# Patient Record
Sex: MALE | Race: BLACK OR AFRICAN AMERICAN | NOT HISPANIC OR LATINO | Employment: OTHER | URBAN - METROPOLITAN AREA
[De-identification: names, ages, dates, MRNs, and addresses within clinical notes are randomized per-mention and may not be internally consistent; named-entity substitution may affect disease eponyms.]

---

## 2017-03-06 ENCOUNTER — HOSPITAL ENCOUNTER (OUTPATIENT)
Dept: RADIOLOGY | Facility: HOSPITAL | Age: 52
Discharge: HOME | End: 2017-03-06
Attending: INTERNAL MEDICINE
Payer: MEDICARE

## 2017-03-06 DIAGNOSIS — M25.561 RIGHT KNEE PAIN: ICD-10-CM

## 2017-03-06 PROCEDURE — 73560 X-RAY EXAM OF KNEE 1 OR 2: CPT | Mod: TC,PO,RT

## 2017-08-03 ENCOUNTER — HOSPITAL ENCOUNTER (EMERGENCY)
Facility: HOSPITAL | Age: 52
Discharge: HOME | End: 2017-08-03
Attending: EMERGENCY MEDICINE
Payer: MEDICARE

## 2017-08-03 VITALS
HEIGHT: 66 IN | BODY MASS INDEX: 34.55 KG/M2 | SYSTOLIC BLOOD PRESSURE: 130 MMHG | OXYGEN SATURATION: 98 % | WEIGHT: 215 LBS | RESPIRATION RATE: 20 BRPM | DIASTOLIC BLOOD PRESSURE: 87 MMHG | HEART RATE: 80 BPM | TEMPERATURE: 99 F

## 2017-08-03 DIAGNOSIS — S40.012A CONTUSION OF LEFT SHOULDER, INITIAL ENCOUNTER: Primary | ICD-10-CM

## 2017-08-03 PROCEDURE — 99283 EMERGENCY DEPT VISIT LOW MDM: CPT

## 2017-08-03 PROCEDURE — 25000003 PHARM REV CODE 250: Performed by: EMERGENCY MEDICINE

## 2017-08-03 RX ORDER — TRAMADOL HYDROCHLORIDE AND ACETAMINOPHEN 37.5; 325 MG/1; MG/1
1-2 TABLET, FILM COATED ORAL EVERY 6 HOURS PRN
Qty: 20 TABLET | Refills: 0 | Status: SHIPPED | OUTPATIENT
Start: 2017-08-03

## 2017-08-03 RX ORDER — METFORMIN HYDROCHLORIDE 500 MG/1
500 TABLET ORAL 2 TIMES DAILY WITH MEALS
COMMUNITY

## 2017-08-03 RX ORDER — IBUPROFEN 400 MG/1
800 TABLET ORAL
Status: COMPLETED | OUTPATIENT
Start: 2017-08-03 | End: 2017-08-03

## 2017-08-03 RX ORDER — ATORVASTATIN CALCIUM 10 MG/1
40 TABLET, FILM COATED ORAL DAILY
COMMUNITY

## 2017-08-03 RX ADMIN — IBUPROFEN 800 MG: 400 TABLET, FILM COATED ORAL at 04:08

## 2017-08-03 NOTE — ED PROVIDER NOTES
"Encounter Date: 8/3/2017       History     Chief Complaint   Patient presents with    Shoulder Pain     Pt states "I fell about a hour ago to the concrete and it hurt my left shoulder"     Patient is a 52-year-old female who presents to the emergency room with a chief complaint left shoulder pain after a fall experienced today.  He states he lost his balance and landed on his left shoulder.  There was no loss of consciousness.  He denies a syncopal episode.  There is no deformity.  There is no numbness or tingling.  He does complain of 6 out of 10 pain to the area.          Review of patient's allergies indicates:   Allergen Reactions    Haldol [haloperidol lactate] Other (See Comments)     Past Medical History:   Diagnosis Date    Diabetes mellitus     Hypertension      History reviewed. No pertinent surgical history.  History reviewed. No pertinent family history.  Social History   Substance Use Topics    Smoking status: Never Smoker    Smokeless tobacco: Never Used    Alcohol use Not on file     Review of Systems   Constitutional: Negative for fever.   HENT: Negative for sore throat.    Respiratory: Negative for shortness of breath.    Cardiovascular: Negative for chest pain.   Gastrointestinal: Negative for nausea.   Genitourinary: Negative for dysuria.   Musculoskeletal: Negative for back pain.   Skin: Negative for rash.   Neurological: Negative for weakness.   Hematological: Does not bruise/bleed easily.   All other systems reviewed and are negative.      Physical Exam     Initial Vitals [08/03/17 1453]   BP Pulse Resp Temp SpO2   133/87 82 20 98.5 °F (36.9 °C) 97 %      MAP       102.33         Physical Exam    Nursing note and vitals reviewed.  Constitutional: Vital signs are normal. He appears well-developed and well-nourished. He is cooperative.  Non-toxic appearance.   HENT:   Head: Normocephalic and atraumatic.   Eyes: Conjunctivae, EOM and lids are normal.   Neck: Trachea normal and normal range " of motion. Neck supple. No stridor present. No tracheal deviation present. No neck rigidity. No Brudzinski's sign and no Kernig's sign noted.   Cardiovascular: Normal rate, regular rhythm, normal heart sounds and normal pulses.   Abdominal: Soft. Normal appearance and bowel sounds are normal. He exhibits no abdominal bruit. There is no tenderness. There is no rebound.   Musculoskeletal:        Left shoulder: He exhibits tenderness and bony tenderness. He exhibits normal range of motion, no swelling and no effusion.   Neurological: He is alert and oriented to person, place, and time. He has normal strength. GCS eye subscore is 4. GCS verbal subscore is 5. GCS motor subscore is 6.   Skin: Skin is warm, dry and intact. Capillary refill takes less than 2 seconds.   Psychiatric: He has a normal mood and affect. His behavior is normal. Judgment normal. Thought content is not delusional. He expresses no homicidal and no suicidal ideation.         ED Course   Procedures  Labs Reviewed - No data to display        Imaging Results          X-Ray Shoulder 2 or More Views Left (Final result)  Result time 08/03/17 15:12:05    Final result by Sarah Patterson MD (08/03/17 15:12:05)                 Impression:        No acute abnormalities.      Electronically signed by: SARAH PATTERSON MD  Date:     08/03/17  Time:    15:12              Narrative:    3 views of the left shoulder    History: Fall.  Pain.    Findings:    No acute fractures.  No evidence of dislocation.  Calcific tendinosis is noted.                                      I discussed with patient and/or family/caretaker that negative X-ray does not rule out occult fracture or other soft tissue injury.  Persistent pain greater than 7-10 days or increased pain requires follow up, specifically with orthopedics.         Trauma precautions were discussed with patient and/or family/caretaker; I do not specifically detect any abdominal, thoracic, CNS, orthopedic,  or other emergent or life threatening condition and that patient is safe to be discharged.  It was also discussed that despite an unrevealing examination and negative radiographic examination for serious or life threatening injury, these conditions may still exist.  As such, patient should return to ED immediately should they experience, severe or worsening pain, shortness of breath, abdominal pain, headache, vomiting, or any other concern.  It was also discussed that not infrequently, injuries may not be diagnosed during the initial ED visit (such as fractures) and that if the patient discovers a new area of concern, a new area of injury that was not evaluated in the ED, they should return for evaluation as they may have an injury that requires treatment.             ED Course     Clinical Impression:   The encounter diagnosis was Contusion of left shoulder, initial encounter.    Disposition:   Disposition: Discharged  Condition: Stable                        Pravin Coto MD  08/03/17 7439

## 2017-08-21 ENCOUNTER — CLINICAL SUPPORT (OUTPATIENT)
Dept: REHABILITATION | Facility: HOSPITAL | Age: 52
End: 2017-08-21
Attending: INTERNAL MEDICINE
Payer: MEDICARE

## 2017-08-21 DIAGNOSIS — R29.898 WEAKNESS OF BOTH LOWER EXTREMITIES: ICD-10-CM

## 2017-08-21 DIAGNOSIS — M62.9 HAMSTRING TIGHTNESS OF BOTH LOWER EXTREMITIES: ICD-10-CM

## 2017-08-21 DIAGNOSIS — R26.89 ANTALGIC GAIT: ICD-10-CM

## 2017-08-21 DIAGNOSIS — M25.662 DECREASED RANGE OF MOTION OF LEFT KNEE: ICD-10-CM

## 2017-08-21 DIAGNOSIS — M25.661 DECREASED RANGE OF MOTION OF RIGHT KNEE: ICD-10-CM

## 2017-08-21 PROCEDURE — G8978 MOBILITY CURRENT STATUS: HCPCS | Mod: CL

## 2017-08-21 PROCEDURE — 97110 THERAPEUTIC EXERCISES: CPT

## 2017-08-21 PROCEDURE — G8979 MOBILITY GOAL STATUS: HCPCS | Mod: CJ

## 2017-08-21 PROCEDURE — 97161 PT EVAL LOW COMPLEX 20 MIN: CPT

## 2017-08-21 NOTE — PLAN OF CARE
"  TIME RECORD    Date: 08/21/2017    Start Time:  9:00  Stop Time:  10:00    PROCEDURES:    TIMED  Procedure Min.   TE 15                     UNTIMED  Procedure Min.   PT eval 45         Total Timed Minutes:  15  Total Timed Units:  1  Total Untimed Units:  1  Charges Billed/# of units:  2    OUTPATIENT PHYSICAL THERAPY   PATIENT EVALUATION  Onset Date: Chronic  Primary Diagnosis:   1. Decreased range of motion of left knee     2. Decreased range of motion of right knee     3. Weakness of both lower extremities     4. Antalgic gait     5. Hamstring tightness of both lower extremities       Treatment Diagnosis: decreased ROM, weakness, HS tightness, antalgic gait  Past Medical History:   Diagnosis Date    Diabetes mellitus     Hypertension      Precautions: Standard  Prior Therapy: None  Medications: Juan Carlos Hammer has a current medication list which includes the following prescription(s): atorvastatin, metformin, and tramadol-acetaminophen 37.5-325 mg.  Nutrition:  Obese  History of Present Illness: recent exacerbation of chronic R knee pain  Prior Level of Function: Independent  Social History: Lives with significant other  Place of Residence (Steps/Adaptations): Single story home, 3 steps outside the home no hand rails  Functional Deficits Leading to Referral/Nature of Injury: Difficulty with dressing, ambulating, ascending/desending steps, performing yard work  Patient Therapy Goals: To get better    Subjective     Juan Carlos Hammer states he has been having problems with R knee for 30 years, it started when he knelt on a carpet tack and it has gotten worse since that time. His R knee bothers him more than L knee, L knee just has some arthritis in it. His R knee always feels like there is "pus in there". He does not use a cane to ambulate as he does not want to become dependent on it. He does have an exercise bike at home and uses it sometimes, but he admits to being too lazy to use it on a regular basis even though his knees " "feel better after riding it. He reports that he currently has trouble walking, getting his shoes on because he can't bend his R knee all the way, difficulty performing his usual yard work and household duties. He denies having any difficulty with vehicle transfers, stepping over the side of the tub, or bed mobility.      Pain:  Location: knee R>L  Description: Aching and Burning  Activities Which Increase Pain: Sitting, Bending and Walking  Activities Which Decrease Pain: pain medication and heating pad  Pain Scale: 5/10 at best 9/10 now  10/10 at worst    Objective     Posture: sitting with knees flexed, genu valgus B, decreased WB on R LE in standing and when performing sit to stand, crepitus L & R knee   Palpation: no TTP L, R TTP medial femoral condyle, B patella med/lat borders  Sensation: light touch intact  DTRs: N/A  Range of Motion/Strength:     Hip Right  Left  Pain/Dysfunction with Movement    AROM MMT AROM MMT    Flexion WFL 3/5 WFL 3/5    Extension NT 2+/5 NT 2+/5    Abduction WFL 3/5 WFL 3/5       Knee  Right   Left  Pain/Dysfunction with Movement    AROM PROM MMT AROM PROM MMT    Flexion 110 120 4/5 115 120 3+/5 R pain "like it is coming apart" MMT   Extension -15 -10 3+/5 -8 -2 4/5 R pain "whole damn knee, I can't take it" MMT     Ankle Right  Left  Pain/Dysfunction with Movement    AROM MMT AROM MMT    Plantarflexion WFL 5/5 WFL 5/5    Dorsiflexion WFL 5/5 WFL 5/5 Pain in R knee with MMT       Flexibility: patella mobility inaccurate due to patient guarding, SLR L 60*, R 60*  Gait: Without AD  Analysis: Assistance none, antalgic gait, decreased knee extension, hip extension, step to descending steps,   Bed Mobility:Independent  Transfers: Independent  Special Tests:Lachman's negative B, Valgus and Varus stress test negative for laxity  Other: N/A  Functional Limitation Reports: G codes  Tool: FOTO Knee SURVEY  Category: Mobility ()  Limitation: 72%  Current: CL = least 60% but < 80% impaired, " limited or restricted  Goal: CJ = at least 20% but < 40% impaired, limited or restricted    Treatment: Patient was educated on the plan of care and treatment options. He is in agreement with the plan of care. Patient performed therapeutic exercises 1:1 with PT x 15 mintues of HS stretch, quad sets, SLR, and heel slides. He was given hand outs of today's exercises for his HEP and instructed to perform it to his tolerance.     Assessment       Initial Assessment (Pertinent finding, problem list and factors affecting outcome): Mr. Hammer is a 52 year old male, who presents the clinic with complaints of chronic B knee pain, R > L. He demonstrates decreased AROM of B knees that limits his ability to ambulate with a normal gait pattern and rosibel his shoes without an increase in symptoms. His B hamstring tightness also limits his AROM of B knees.  His B LE weakness limits his ability to perform his usual household duties and yard work without an increase in symptoms. His LE weakness also contributes to his increased reliance on UE leverage for transfers and ascend/descend steps with a reciprocal gait pattern. When ambulating he has a wide JUMANA with decreased knee and hip flexion during swing phase. His current score on FOTO Knee Survey places him in the 60%<80% impaired, limited, or restricted category. He would benefit from physical therapy to improve his strength, ROM, flexibility, and gait in order to improve his functional mobility.   History  Co-morbidities and personal factors that may impact the plan of care Examination  Body Structures and Functions, activity limitations and participation restrictions that may impact the plan of care Clinical Presentation   Decision Making/ Complexity Score   Co-morbidities:   OA              Personal Factors:   None Body Regions:B LE    Body Systems: Musculoskeletal - LE weakness, decreased ROM of B knees, B HS tightness; Neuromuscular - increased reliance on UEs for transfers,  antalgic gait, decreased functional mobility; cardiovascular - N/A; Integumentary - N/A        Activity limitations/Participation Restrictions: Difficulty with prolonged walking, standing, ascending/descending steps, dressing, performing yardwork and household duties         stable and uncomplicated                   Low complexity    FOTO Knee 28, 60%<80% impaired, limited, or restricted         Rehab Potiential: good  Barriers to Rehab: None  Short Term Goals (4 Weeks):   1.  This patient will be idependent with initial HEP for L/E strengthening and knee ROM.  2.  This patient will Increase B knee AROM to -5* extension to 115 degrees flexion in order to rosibel his shoes with no difficulty.  3.  This patient will Increase B hip and knee MMT 1 full muscle grade all deficit motions in order to perform sit to stand transfers with no UE leverage.  4.  This patient will be able to ambulate greater than 100 feet with a normal gait pattern and no increase in symptoms.  5.  This patient will have a pain rating of 5/10 at worst with ADLs.  6.  Patient will be able to achieve greater than or equal to 45 on the FOTO Knee Survey placing patient in 40%<60% impaired, limited, or restricted category demonstrating overall improved functional ability with lower extremity.   Long Term Goals (8 Weeks):   1.  This patient will be independent with updated HEP.  2.  This patient will increase B knee AROM to 0* extension to 120 degrees flexion in order to perform his usual ADLs with no increase in symptoms.   3.  This patient will have B LE strength of 5/5 in order to ascend/descend steps with a reciprocal gait pattern with no increase in symptoms.   4.  This patient will be able to ambulate community distances without use of assistive device without pain or gait deficits.  5. This patient will have a pain rating of 3/10 at worst with ADLs.  6. Patient will be able to achieve greater than or equal to 65 on the FOTO Knee Survey placing  patient in 20%<40% impaired, limited, or restricted category demonstrating overall improved functional ability with lower extremity.     Plan     Certification Period: 08/21/17 to 10/21/17  Recommended Treatment Plan: 2 times per week for 8 weeks: Gait Training, Group Therapy, Manual Therapy, Moist Heat/ Ice, Patient Education and Therapeutic Exercise  Other Recommendations: modalities prn, ASTYM prn, kinesiotape prn, Functional Dry Needling prn       Therapist: Chuyita Ledezma, PT    I CERTIFY THE NEED FOR THESE SERVICES FURNISHED UNDER THIS PLAN OF TREATMENT AND WHILE UNDER MY CARE    Physician's comments: ________________________________________________________________________________________________________________________________________________      Physician's Name: ___________________________________

## 2017-08-24 ENCOUNTER — CLINICAL SUPPORT (OUTPATIENT)
Dept: REHABILITATION | Facility: HOSPITAL | Age: 52
End: 2017-08-24
Attending: INTERNAL MEDICINE
Payer: MEDICARE

## 2017-08-24 DIAGNOSIS — R29.898 WEAKNESS OF BOTH LOWER EXTREMITIES: ICD-10-CM

## 2017-08-24 DIAGNOSIS — M25.661 DECREASED RANGE OF MOTION OF RIGHT KNEE: ICD-10-CM

## 2017-08-24 DIAGNOSIS — M62.9 HAMSTRING TIGHTNESS OF BOTH LOWER EXTREMITIES: ICD-10-CM

## 2017-08-24 DIAGNOSIS — M25.662 DECREASED RANGE OF MOTION OF LEFT KNEE: ICD-10-CM

## 2017-08-24 DIAGNOSIS — R26.89 ANTALGIC GAIT: ICD-10-CM

## 2017-08-24 PROCEDURE — 97110 THERAPEUTIC EXERCISES: CPT

## 2017-08-24 NOTE — PROGRESS NOTES
"TIME RECORD    Date:  08/24/2017    Start Time:  9:00  Stop Time:  9:35    PROCEDURES:    TIMED  Procedure Min.   TE 30   Bike sup 5 NC                 UNTIMED  Procedure Min.             Total Timed Minutes:  30  Total Timed Units:  2  Total Untimed Units:  0  Charges Billed/# of units:  2 (TE-2)      Progress/Current Status    Subjective:     Patient ID: Juan Carlos Hammer is a 52 y.o. male.  Diagnosis:   1. Decreased range of motion of left knee     2. Decreased range of motion of right knee     3. Weakness of both lower extremities     4. Antalgic gait     5. Hamstring tightness of both lower extremities       Pain: 7 /10  Patient reports he continues with severe bilateral knee pain.    Objective:     Patient was educated and performed therapeutic exercises as per log below 1:1 with PTA x 30 minutes to improve ROM, flexibility, strength and gait.  Patient ended today's session on the stationary bike x 5 minutes supervised by PTA.  Patient declined a cold pack at the end of today's session.     Date  08/24/17   VISIT  INS. AUTH. EXP. 2/12  10/16/17   G CODE VISIT 2/10   POC EXP. DATE 10/21/17   VISIT AMOUNT    MEDICARE TOTAL $63.96    $171.54   FACE-TO-FACE 09/21/17       Bike  5'   TABLE:    HSS w/ strap 10 x 10"   Bridge  1 x 10   SLR 1 x 10   Hip abduction - SL 1 x 10   Hip adduction - supine 10 x 3" w/ ball   Hip extension --   SAQ 1 x 15   Heel Slides 1 x 15   SEATED:    LAQs 1 x 10   Seated Hip Flex 1 x 10   HS curls 1 x 10 RTB   STANDING:    Mini squats 1 x 10   Heel raises 1 x 10   Hip Abd --   Hip Flex --   Hip Ext --   HS Curls --   Step Ups --       CP declined       Initials GWA 1/6       Assessment:     Patient requires regular cues to avoid using momentum with each exercise and to go through his available ROM.    Patient Education/Response:     Patient was given a written copy of today's exercises for his HEP and instructed to perform twice a day.  Patient verbalized understanding instructions.     Plans and " Goals:     Continue with POC and progress toward PT goals.    Short Term Goals (4 Weeks):   1.  This patient will be idependent with initial HEP for L/E strengthening and knee ROM.  2.  This patient will Increase B knee AROM to -5* extension to 115 degrees flexion in order to rosibel his shoes with no difficulty.  3.  This patient will Increase B hip and knee MMT 1 full muscle grade all deficit motions in order to perform sit to stand transfers with no UE leverage.  4.  This patient will be able to ambulate greater than 100 feet with a normal gait pattern and no increase in symptoms.  5.  This patient will have a pain rating of 5/10 at worst with ADLs.  6.  Patient will be able to achieve greater than or equal to 45 on the FOTO Knee Survey placing patient in 40%<60% impaired, limited, or restricted category demonstrating overall improved functional ability with lower extremity.     Long Term Goals (8 Weeks):   1.  This patient will be independent with updated HEP.  2.  This patient will increase B knee AROM to 0* extension to 120 degrees flexion in order to perform his usual ADLs with no increase in symptoms.   3.  This patient will have B LE strength of 5/5 in order to ascend/descend steps with a reciprocal gait pattern with no increase in symptoms.   4.  This patient will be able to ambulate community distances without use of assistive device without pain or gait deficits.  5. This patient will have a pain rating of 3/10 at worst with ADLs.  6. Patient will be able to achieve greater than or equal to 65 on the FOTO Knee Survey placing patient in 20%<40% impaired, limited, or restricted category demonstrating overall improved functional ability with lower extremity.

## 2017-08-29 ENCOUNTER — CLINICAL SUPPORT (OUTPATIENT)
Dept: REHABILITATION | Facility: HOSPITAL | Age: 52
End: 2017-08-29
Attending: INTERNAL MEDICINE
Payer: MEDICARE

## 2017-08-29 DIAGNOSIS — M25.661 DECREASED RANGE OF MOTION OF RIGHT KNEE: ICD-10-CM

## 2017-08-29 DIAGNOSIS — R26.89 ANTALGIC GAIT: ICD-10-CM

## 2017-08-29 DIAGNOSIS — R29.898 WEAKNESS OF BOTH LOWER EXTREMITIES: ICD-10-CM

## 2017-08-29 DIAGNOSIS — M62.9 HAMSTRING TIGHTNESS OF BOTH LOWER EXTREMITIES: ICD-10-CM

## 2017-08-29 DIAGNOSIS — M25.662 DECREASED RANGE OF MOTION OF LEFT KNEE: ICD-10-CM

## 2017-08-29 PROCEDURE — 97110 THERAPEUTIC EXERCISES: CPT

## 2017-08-29 NOTE — PROGRESS NOTES
"TIME RECORD    Date:  08/29/2017    Start Time:  8:00  Stop Time:  8:45    PROCEDURES:    TIMED  Procedure Min.   TE 40   Bike sup 5 NC                 UNTIMED  Procedure Min.             Total Timed Minutes:  40  Total Timed Units:  3  Total Untimed Units:  0  Charges Billed/# of units:  3 (TE-3)      Progress/Current Status    Subjective:     Patient ID: Juan Carlos Hammer is a 52 y.o. male.  Diagnosis:   1. Decreased range of motion of left knee     2. Decreased range of motion of right knee     3. Weakness of both lower extremities     4. Antalgic gait     5. Hamstring tightness of both lower extremities       Pain: 7 /10  He reports having some pain in his knees this morning. He has been doing his HEP on a regular basis with no difficulty. He still has some stiffness first thing in the morning after sleeping.     Objective:     Patient initiated the session on stationary bike x 5 minutes supervised by PT. Patient was educated and performed therapeutic exercises as per log below, along with today's progressions 1:1 with PT x 40 minutes to improve ROM, flexibility, strength and gait. Patient ended today's session on the stationary bike x 5 minutes supervised by PT.  Patient declined a cold pack at the end of today's session.     Date  08/29/17 08/24/17   VISIT  INS. AUTH. EXP. 3/12  10/16/17 2/12  10/16/17   G CODE VISIT 3/10 2/10   POC EXP. DATE 10/21/17 10/21/17   VISIT AMOUNT    MEDICARE TOTAL 63.96    235.50 $63.96    $171.54   FACE-TO-FACE 09/21/17 09/21/17        Bike  5' 5'   TABLE:     HSS w/ strap 10 x 10" 10 x 10"   Bridge  1 x 15 1 x 10   SLR 2 x 10 1 x 10   Hip abduction - SL 1 x 15 1 x 10   Hip adduction - supine 15 x 3" w/ ball 10 x 3" w/ ball   Hip extension 1 x 10 --   SAQ 2 x 10 1 x 15   Heel Slides 1 x 20 1 x 15   Prone hang 3'    SEATED:     LAQs 1 x 15 1 x 10   Seated Hip Flex 1 x 15 1 x 10   HS curls 1 x 15 RTB 1 x 10 RTB   STANDING:     Mini squats 1 x 15 1 x 10   Heel raises 1 x 15 1 x 10   Hip Abd " -- --   Hip Flex -- --   Hip Ext -- --   HS Curls -- --   Step Ups -- --        CP declined declined        Initials DG GWA 1/6       Assessment:     He required frequent cues to move slowly with his stretches and cues for technique with mini squats. He was able to perform all of today's progressions and new exercises with no increase in symptoms prior to leaving the clinic.     Patient Education/Response:     Patient was instructed to continue to perform his HEP to his tolerance twice a day.  Patient verbalized understanding instructions.     Plans and Goals:     Continue with POC and progress toward PT goals.    Short Term Goals (4 Weeks):   1.  This patient will be idependent with initial HEP for L/E strengthening and knee ROM.  2.  This patient will Increase B knee AROM to -5* extension to 115 degrees flexion in order to rosibel his shoes with no difficulty.  3.  This patient will Increase B hip and knee MMT 1 full muscle grade all deficit motions in order to perform sit to stand transfers with no UE leverage.  4.  This patient will be able to ambulate greater than 100 feet with a normal gait pattern and no increase in symptoms.  5.  This patient will have a pain rating of 5/10 at worst with ADLs.  6.  Patient will be able to achieve greater than or equal to 45 on the FOTO Knee Survey placing patient in 40%<60% impaired, limited, or restricted category demonstrating overall improved functional ability with lower extremity.     Long Term Goals (8 Weeks):   1.  This patient will be independent with updated HEP.  2.  This patient will increase B knee AROM to 0* extension to 120 degrees flexion in order to perform his usual ADLs with no increase in symptoms.   3.  This patient will have B LE strength of 5/5 in order to ascend/descend steps with a reciprocal gait pattern with no increase in symptoms.   4.  This patient will be able to ambulate community distances without use of assistive device without pain or gait  deficits.  5. This patient will have a pain rating of 3/10 at worst with ADLs.  6. Patient will be able to achieve greater than or equal to 65 on the FOTO Knee Survey placing patient in 20%<40% impaired, limited, or restricted category demonstrating overall improved functional ability with lower extremity.

## 2017-08-31 ENCOUNTER — CLINICAL SUPPORT (OUTPATIENT)
Dept: REHABILITATION | Facility: HOSPITAL | Age: 52
End: 2017-08-31
Attending: INTERNAL MEDICINE
Payer: MEDICARE

## 2017-08-31 DIAGNOSIS — M25.661 DECREASED RANGE OF MOTION OF RIGHT KNEE: ICD-10-CM

## 2017-08-31 DIAGNOSIS — M25.662 DECREASED RANGE OF MOTION OF LEFT KNEE: ICD-10-CM

## 2017-08-31 DIAGNOSIS — M62.9 HAMSTRING TIGHTNESS OF BOTH LOWER EXTREMITIES: ICD-10-CM

## 2017-08-31 DIAGNOSIS — R29.898 WEAKNESS OF BOTH LOWER EXTREMITIES: ICD-10-CM

## 2017-08-31 DIAGNOSIS — R26.89 ANTALGIC GAIT: ICD-10-CM

## 2017-08-31 PROCEDURE — 97110 THERAPEUTIC EXERCISES: CPT

## 2017-08-31 NOTE — PROGRESS NOTES
"TIME RECORD    Date:  08/31/2017    Start Time:  8:00  Stop Time:  8:50    PROCEDURES:    TIMED  Procedure Min.   Bike sup 5 NC   TE 25   TE sup 20 NC             UNTIMED  Procedure Min.             Total Timed Minutes:  25  Total Timed Units:  2  Total Untimed Units:  0  Charges Billed/# of units:  2 (TE-2)      Progress/Current Status    Subjective:     Patient ID: Juan Carlos Hammer is a 52 y.o. male.  Diagnosis:   1. Decreased range of motion of left knee     2. Decreased range of motion of right knee     3. Weakness of both lower extremities     4. Antalgic gait     5. Hamstring tightness of both lower extremities       Pain: 7 /10  Patient reports he continues with pain in his knees although some days they hurt more.    Objective:     Patient initiated the session on stationary bike x 5 minutes supervised by PTA. Patient was educated and performed therapeutic exercises as per log below, along with today's progressions 1:1 with PTA x 25 minutes and supervised exercises by PTA x 20 minutes to improve ROM, flexibility, strength and gait.   Patient declined a cold pack at the end of today's session.     Date  08/31/17 08/29/17 08/24/17   VISIT  INS. AUTH. EXP. 4/12  10/16/17 3/12  10/16/17 2/12  10/16/17   G CODE VISIT 4/10 3/10 2/10   POC EXP. DATE 10/21/17 10/21/17 10/21/17   VISIT AMOUNT    MEDICARE TOTAL $63.96    $299.46 63.96    235.50 $63.96    $171.54   FACE-TO-FACE 09/21/17 09/21/17 09/21/17         Bike  5' 5' 5'   TABLE:      HSS w/ strap 10 x 10" 10 x 10" 10 x 10"   Bridge  1 x 20 1 x 15 1 x 10   SLR 3 x 10 2 x 10 1 x 10   Hip abduction - SL 2 x 10 1 x 15 1 x 10   Hip adduction - supine 20 x 3" w/ ball 15 x 3" w/ ball 10 x 3" w/ ball   Hip extension 2 x 10 1 x 10 --   SAQ 3 x 10 2 x 10 1 x 15   Heel Slides 1 x 20 1 x 20 1 x 15   Prone hang 3' 3'    SEATED:      LAQs 2 x 10 1 x 15 1 x 10   Seated Hip Flex 2 x 10 1 x 15 1 x 10   HS curls 2 x 10 GTB 1 x 15 RTB 1 x 10 RTB   STANDING:      Mini squats 2 x 10 1 x 15 1 " x 10   Heel raises 2 x 10 1 x 15 1 x 10   Hip Abd -- -- --   Hip Flex -- -- --   Hip Ext -- -- --   HS Curls -- -- --   Step Ups -- -- --         CP declined declined declined         Initials GWA 1/6 DG GWA 1/6       Assessment:     He required frequent cues to move slowly with his stretches, to avoid momentum with all exercises and cues for technique with mini squats.  Patient completed his therapy along with today's progressions with no increase in symptoms prior to leaving the clinic.     Patient Education/Response:     Patient was instructed to continue to perform his HEP to his tolerance twice a day.  Patient verbalized understanding instructions.     Plans and Goals:     Continue with POC and progress toward PT goals.    Short Term Goals (4 Weeks):   1.  This patient will be idependent with initial HEP for L/E strengthening and knee ROM.  2.  This patient will Increase B knee AROM to -5* extension to 115 degrees flexion in order to rosibel his shoes with no difficulty.  3.  This patient will Increase B hip and knee MMT 1 full muscle grade all deficit motions in order to perform sit to stand transfers with no UE leverage.  4.  This patient will be able to ambulate greater than 100 feet with a normal gait pattern and no increase in symptoms.  5.  This patient will have a pain rating of 5/10 at worst with ADLs.  6.  Patient will be able to achieve greater than or equal to 45 on the FOTO Knee Survey placing patient in 40%<60% impaired, limited, or restricted category demonstrating overall improved functional ability with lower extremity.     Long Term Goals (8 Weeks):   1.  This patient will be independent with updated HEP.  2.  This patient will increase B knee AROM to 0* extension to 120 degrees flexion in order to perform his usual ADLs with no increase in symptoms.   3.  This patient will have B LE strength of 5/5 in order to ascend/descend steps with a reciprocal gait pattern with no increase in symptoms.   4.   This patient will be able to ambulate community distances without use of assistive device without pain or gait deficits.  5. This patient will have a pain rating of 3/10 at worst with ADLs.  6. Patient will be able to achieve greater than or equal to 65 on the FOTO Knee Survey placing patient in 20%<40% impaired, limited, or restricted category demonstrating overall improved functional ability with lower extremity.

## 2017-09-06 ENCOUNTER — CLINICAL SUPPORT (OUTPATIENT)
Dept: REHABILITATION | Facility: HOSPITAL | Age: 52
End: 2017-09-06
Attending: INTERNAL MEDICINE
Payer: MEDICARE

## 2017-09-06 DIAGNOSIS — M62.9 HAMSTRING TIGHTNESS OF BOTH LOWER EXTREMITIES: ICD-10-CM

## 2017-09-06 DIAGNOSIS — M25.661 DECREASED RANGE OF MOTION OF RIGHT KNEE: ICD-10-CM

## 2017-09-06 DIAGNOSIS — R29.898 WEAKNESS OF BOTH LOWER EXTREMITIES: ICD-10-CM

## 2017-09-06 DIAGNOSIS — R26.89 ANTALGIC GAIT: ICD-10-CM

## 2017-09-06 DIAGNOSIS — M25.662 DECREASED RANGE OF MOTION OF LEFT KNEE: ICD-10-CM

## 2017-09-06 PROCEDURE — 97110 THERAPEUTIC EXERCISES: CPT

## 2017-09-06 NOTE — PROGRESS NOTES
"TIME RECORD    Date:  09/06/2017    Start Time:  8:00  Stop Time:  9:00    PROCEDURES:    TIMED  Procedure Min.   Bike sup 5 NC   TE 55                 UNTIMED  Procedure Min.             Total Timed Minutes:  55  Total Timed Units:  4  Total Untimed Units:  0  Charges Billed/# of units:  4 (TE-4)      Progress/Current Status    Subjective:     Patient ID: Juan Carlos Hammer is a 52 y.o. male.  Diagnosis:   1. Decreased range of motion of left knee     2. Decreased range of motion of right knee     3. Weakness of both lower extremities     4. Antalgic gait     5. Hamstring tightness of both lower extremities       Pain: 6 /10  He reports having more R knee pain than L knee today. He cut a 1/2 acre of grass yesterday with no trouble. He has been doing his HEP, just has to take his take time. He has noticed a different in his legs since starting therapy as he is now able to do more things without pain.     Objective:     Patient initiated the session on stationary bike x 5 minutes supervised by PT. Patient was educated and performed therapeutic exercises as per log below, along with today's progressions 1:1 with PT x 55 minutes to improve ROM, flexibility, strength and gait.  Patient declined a cold pack at the end of today's session.     Date  09/06/17 08/31/17 08/29/17 08/24/17   VISIT  INS. AUTH. EXP. 5/12  10/16/17 4/12  10/16/17 3/12  10/16/17 2/12  10/16/17   G CODE VISIT 5/10 4/10 3/10 2/10   POC EXP. DATE 10/21/17 10/21/17 10/21/17 10/21/17   VISIT AMOUNT    MEDICARE TOTAL 127.92    427.38 $63.96    $299.46 63.96    235.50 $63.96    $171.54   FACE-TO-FACE 09/21/17 09/21/17 09/21/17 09/21/17          Bike  5' 5' 5' 5'   TABLE:       HSS w/ strap 10 x 10" 10 x 10" 10 x 10" 10 x 10"   Bridge  1 x 25 1 x 20 1 x 15 1 x 10   SLR 2 x 10 x 1# 3 x 10 2 x 10 1 x 10   Hip abduction - SL 3 x 10 2 x 10 1 x 15 1 x 10   Hip adduction - supine SL 1 x 10 20 x 3" w/ ball 15 x 3" w/ ball 10 x 3" w/ ball   Hip extension 3 x 10 2 x 10 1 x " 10 --   SAQ 2 x 10 x 1# 3 x 10 2 x 10 1 x 15   Heel Slides 1 x 20 1 x 20 1 x 20 1 x 15   Prone hang 3' 3' 3'    SEATED:       LAQs 3 x 10 2 x 10 1 x 15 1 x 10   Seated Hip Flex 3 x 10 2 x 10 1 x 15 1 x 10   HS curls 3 x 10 GTB 2 x 10 GTB 1 x 15 RTB 1 x 10 RTB   STANDING:       Mini squats 2 x 10 2 x 10 1 x 15 1 x 10   Heel raises 2 x 10 2 x 10 1 x 15 1 x 10   Hip Abd -- -- -- --   Hip Flex -- -- -- --   Hip Ext -- -- -- --   HS Curls -- -- -- --   Step Ups -- -- -- --          CP declined declined declined declined          Initials DG GWA 1/6 DG GWA 1/6     FOTO knee 61, 20%<40%  Assessment:     He required occasional cues with his exercises to avoid substitutions and to avoid using momentum with his exercises. He was able to perform all of today's progressions with no increase in symptoms prior to leaving the clinic. His current score on FOTO Knee places him in the 20%<40% impaired, limited, or restricted category. He would benefit from continued physical therapy to improve his strength, ROM, and flexibility.     Patient Education/Response:     Patient was instructed to continue to perform his HEP to his tolerance twice a day.  Patient verbalized understanding instructions.     Plans and Goals:     Continue with POC and progress toward PT goals.    Short Term Goals (4 Weeks):   1.  This patient will be idependent with initial HEP for L/E strengthening and knee ROM.  2.  This patient will Increase B knee AROM to -5* extension to 115 degrees flexion in order to rosibel his shoes with no difficulty.  3.  This patient will Increase B hip and knee MMT 1 full muscle grade all deficit motions in order to perform sit to stand transfers with no UE leverage.  4.  This patient will be able to ambulate greater than 100 feet with a normal gait pattern and no increase in symptoms.  5.  This patient will have a pain rating of 5/10 at worst with ADLs.  6.  Patient will be able to achieve greater than or equal to 45 on the FOTO Knee  Survey placing patient in 40%<60% impaired, limited, or restricted category demonstrating overall improved functional ability with lower extremity.     Long Term Goals (8 Weeks):   1.  This patient will be independent with updated HEP.  2.  This patient will increase B knee AROM to 0* extension to 120 degrees flexion in order to perform his usual ADLs with no increase in symptoms.   3.  This patient will have B LE strength of 5/5 in order to ascend/descend steps with a reciprocal gait pattern with no increase in symptoms.   4.  This patient will be able to ambulate community distances without use of assistive device without pain or gait deficits.  5. This patient will have a pain rating of 3/10 at worst with ADLs.  6. Patient will be able to achieve greater than or equal to 65 on the FOTO Knee Survey placing patient in 20%<40% impaired, limited, or restricted category demonstrating overall improved functional ability with lower extremity.

## 2017-09-08 ENCOUNTER — CLINICAL SUPPORT (OUTPATIENT)
Dept: REHABILITATION | Facility: HOSPITAL | Age: 52
End: 2017-09-08
Attending: INTERNAL MEDICINE
Payer: MEDICARE

## 2017-09-08 DIAGNOSIS — R26.89 ANTALGIC GAIT: ICD-10-CM

## 2017-09-08 DIAGNOSIS — R29.898 WEAKNESS OF BOTH LOWER EXTREMITIES: ICD-10-CM

## 2017-09-08 DIAGNOSIS — M62.9 HAMSTRING TIGHTNESS OF BOTH LOWER EXTREMITIES: ICD-10-CM

## 2017-09-08 DIAGNOSIS — M25.661 DECREASED RANGE OF MOTION OF RIGHT KNEE: ICD-10-CM

## 2017-09-08 DIAGNOSIS — M25.662 DECREASED RANGE OF MOTION OF LEFT KNEE: ICD-10-CM

## 2017-09-08 PROCEDURE — 97110 THERAPEUTIC EXERCISES: CPT

## 2017-09-08 NOTE — PROGRESS NOTES
"TIME RECORD    Date:  09/08/2017    Start Time:  8:00  Stop Time:  8:45    PROCEDURES:    TIMED  Procedure Min.   Bike sup 5 NC   TE 40                 UNTIMED  Procedure Min.             Total Timed Minutes:  40  Total Timed Units:  3  Total Untimed Units:  0  Charges Billed/# of units:  3 (TE-3)      Progress/Current Status    Subjective:     Patient ID: Juan Carlos Hammer is a 52 y.o. male.  Diagnosis:   1. Decreased range of motion of left knee     2. Decreased range of motion of right knee     3. Weakness of both lower extremities     4. Antalgic gait     5. Hamstring tightness of both lower extremities       Pain: 7 /10  Patient reports he continues having pain in his knees, it seldom decreases.     Objective:     Patient initiated the session on stationary bike x 5 minutes supervised by PTA.  Patient was educated and performed therapeutic exercises as per log below, along with today's progressions, 1:1 with PTA x 40 minutes to improve ROM, flexibility, strength and gait.  Patient declined a cold pack at the end of today's session.     Date  09/08/17 09/06/17 08/31/17 08/29/17 08/24/17   VISIT  INS. AUTH. EXP. 6/12  10/16/17 5/12  10/16/17 4/12  10/16/17 3/12  10/16/17 2/12  10/16/17   G CODE VISIT 6/10 5/10 4/10 3/10 2/10   POC EXP. DATE 10/21/17 10/21/17 10/21/17 10/21/17 10/21/17   VISIT AMOUNT    MEDICARE TOTAL $95.94    $523.32 127.92    427.38 $63.96    $299.46 63.96    235.50 $63.96    $171.54   FACE-TO-FACE 09/21/17 09/21/17 09/21/17 09/21/17 09/21/17           Bike  5' 5' 5' 5' 5'   TABLE:        HSS w/ strap 10 x 10" 10 x 10" 10 x 10" 10 x 10" 10 x 10"   Bridge  1 x 25 1 x 25 1 x 20 1 x 15 1 x 10   SLR 3 x 10 x 1# 2 x 10 x 1# 3 x 10 2 x 10 1 x 10   Hip abduction - SL 3 x 10 3 x 10 2 x 10 1 x 15 1 x 10   Hip adduction - SL 2 x 10 SL 1 x 10 20 x 3" w/ ball 15 x 3" w/ ball 10 x 3" w/ ball   Hip extension 2 x 10 x 1# 3 x 10 2 x 10 1 x 10 --   Prone hang 3' 3' 3' 3'    SAQ 3 x 10 x 1# 2 x 10 x 1# 3 x 10 2 x 10 1 x " 15   Heel Slides 1 x 20 1 x 20 1 x 20 1 x 20 1 x 15   SEATED:        LAQs 2 x 10 x 1# 3 x 10 2 x 10 1 x 15 1 x 10   Seated Hip Flex 2 x 10 x 1# 3 x 10 2 x 10 1 x 15 1 x 10   HS curls 3 x 10 GTB 3 x 10 GTB 2 x 10 GTB 1 x 15 RTB 1 x 10 RTB   STANDING:        Mini squats 2 x 10 2 x 10 2 x 10 1 x 15 1 x 10   Heel raises 2 x 10 2 x 10 2 x 10 1 x 15 1 x 10   Hip Abd -- -- -- -- --   Hip Flex -- -- -- -- --   Hip Ext -- -- -- -- --   HS Curls -- -- -- -- --   Step Ups -- -- -- -- --           CP declined declined declined declined declined           Initials GWA 1/6 DG GWA 1/6 DG GWA 1/6       Assessment:     He required occasional cues with his exercises to avoid substitutions and to avoid using momentum with his exercises.  Patient requires occasional cues for posture with sitting and standing exercises.  Patient completed his therapy along with today's progressions with no increase in symptoms prior to leaving the clinic.      Patient Education/Response:     Patient was instructed to continue to perform his HEP to his tolerance twice a day.  Patient verbalized understanding instructions.     Plans and Goals:     Continue with POC and progress toward PT goals.    Short Term Goals (4 Weeks):   1.  This patient will be idependent with initial HEP for L/E strengthening and knee ROM.  2.  This patient will Increase B knee AROM to -5* extension to 115 degrees flexion in order to rosibel his shoes with no difficulty.  3.  This patient will Increase B hip and knee MMT 1 full muscle grade all deficit motions in order to perform sit to stand transfers with no UE leverage.  4.  This patient will be able to ambulate greater than 100 feet with a normal gait pattern and no increase in symptoms.  5.  This patient will have a pain rating of 5/10 at worst with ADLs.  6.  Patient will be able to achieve greater than or equal to 45 on the FOTO Knee Survey placing patient in 40%<60% impaired, limited, or restricted category demonstrating  overall improved functional ability with lower extremity.     Long Term Goals (8 Weeks):   1.  This patient will be independent with updated HEP.  2.  This patient will increase B knee AROM to 0* extension to 120 degrees flexion in order to perform his usual ADLs with no increase in symptoms.   3.  This patient will have B LE strength of 5/5 in order to ascend/descend steps with a reciprocal gait pattern with no increase in symptoms.   4.  This patient will be able to ambulate community distances without use of assistive device without pain or gait deficits.  5. This patient will have a pain rating of 3/10 at worst with ADLs.  6. Patient will be able to achieve greater than or equal to 65 on the FOTO Knee Survey placing patient in 20%<40% impaired, limited, or restricted category demonstrating overall improved functional ability with lower extremity.

## 2017-09-12 ENCOUNTER — CLINICAL SUPPORT (OUTPATIENT)
Dept: REHABILITATION | Facility: HOSPITAL | Age: 52
End: 2017-09-12
Attending: INTERNAL MEDICINE
Payer: MEDICARE

## 2017-09-12 DIAGNOSIS — M25.662 DECREASED RANGE OF MOTION OF LEFT KNEE: ICD-10-CM

## 2017-09-12 DIAGNOSIS — M62.9 HAMSTRING TIGHTNESS OF BOTH LOWER EXTREMITIES: ICD-10-CM

## 2017-09-12 DIAGNOSIS — M25.661 DECREASED RANGE OF MOTION OF RIGHT KNEE: ICD-10-CM

## 2017-09-12 DIAGNOSIS — R29.898 WEAKNESS OF BOTH LOWER EXTREMITIES: ICD-10-CM

## 2017-09-12 DIAGNOSIS — R26.89 ANTALGIC GAIT: ICD-10-CM

## 2017-09-12 PROCEDURE — 97110 THERAPEUTIC EXERCISES: CPT

## 2017-09-12 NOTE — PROGRESS NOTES
"TIME RECORD    Date:  09/12/2017    Start Time:  8:00  Stop Time:  8:55    PROCEDURES:    TIMED  Procedure Min.   Bike sup 5 NC   TE 45                 UNTIMED  Procedure Min.             Total Timed Minutes:  45  Total Timed Units:  3  Total Untimed Units:  0  Charges Billed/# of units:  3 (TE-3)      Progress/Current Status    Subjective:     Patient ID: Juan Carlos Hammer is a 52 y.o. male.  Diagnosis:   1. Decreased range of motion of left knee     2. Decreased range of motion of right knee     3. Weakness of both lower extremities     4. Antalgic gait     5. Hamstring tightness of both lower extremities       Pain: 7 /10  He reports continued pain in his knees R >L.    Objective:     Patient initiated the session on stationary bike x 5 minutes supervised by PT.  Patient was educated and performed therapeutic exercises as per log below, along with today's progressions, 1:1 with PT x 45 minutes to improve ROM, flexibility, strength and gait. Patient declined a cold pack at the end of today's session.     Date  09/12/17 09/08/17 09/06/17 08/31/17 08/29/17 08/24/17   VISIT  INS. AUTH. EXP. 7/12  10/16/17 6/12  10/16/17 5/12  10/16/17 4/12  10/16/17 3/12  10/16/17 2/12  10/16/17   G CODE VISIT 7/10 6/10 5/10 4/10 3/10 2/10   POC EXP. DATE 10/21/17 10/21/17 10/21/17 10/21/17 10/21/17 10/21/17   VISIT AMOUNT    MEDICARE TOTAL 95.94    619.26 $95.94    $523.32 127.92    427.38 $63.96    $299.46 63.96    235.50 $63.96    $171.54   FACE-TO-FACE 09/21/197 09/21/17 09/21/17 09/21/17 09/21/17 09/21/17            Bike  L3 x 5' 5' 5' 5' 5' 5'   TABLE:         HSS w/ strap 10 x 10" 10 x 10" 10 x 10" 10 x 10" 10 x 10" 10 x 10"   Bridge  3 x 10 1 x 25 1 x 25 1 x 20 1 x 15 1 x 10   SLR 3 x 10 x 1# 3 x 10 x 1# 2 x 10 x 1# 3 x 10 2 x 10 1 x 10   Hip abduction - SL 3 x 10 3 x 10 3 x 10 2 x 10 1 x 15 1 x 10   Hip adduction - SL 3 x 10 2 x 10 SL 1 x 10 20 x 3" w/ ball 15 x 3" w/ ball 10 x 3" w/ ball   Hip extension 3 x 10 x 1# 2 x 10 x 1# 3 x " 10 2 x 10 1 x 10 --   Prone hang 3' x 1# 3' 3' 3' 3'    SAQ 3 x 10 x 1# 3 x 10 x 1# 2 x 10 x 1# 3 x 10 2 x 10 1 x 15   Heel Slides 1 x 20 1 x 20 1 x 20 1 x 20 1 x 20 1 x 15   SEATED:         LAQs 3 x 10 x 1# 2 x 10 x 1# 3 x 10 2 x 10 1 x 15 1 x 10   Seated Hip Flex 3 x 10 x 1# 2 x 10 x 1# 3 x 10 2 x 10 1 x 15 1 x 10   HS curls  3 x 10 GTB 3 x 10 GTB 2 x 10 GTB 1 x 15 RTB 1 x 10 RTB   STANDING:         Mini squats 1 x 25 2 x 10 2 x 10 2 x 10 1 x 15 1 x 10   Heel raises 1 x 25 2 x 10 2 x 10 2 x 10 1 x 15 1 x 10   Hip Abd -- -- -- -- -- --   Hip Flex -- -- -- -- -- --   Hip Ext -- -- -- -- -- --   HS Curls -- -- -- -- -- --   Step Ups -- -- -- -- -- --            CP declined declined declined declined declined declined            Initials DG GWA 1/6 DG GWA 1/6 DG GWA 1/6       Assessment:     He continues to require cues to avoid using momentum with his exercises and positioning for side lying exercises. He was able to perform all of today's progressions with no increase in symptoms prior to leaving the clinic.       Patient Education/Response:     Patient was instructed to continue to perform his HEP to his tolerance twice a day.  He was also educated that the initial evaluation for his shoulder pain will not be until 9/26/17. Patient verbalized understanding instructions.     Plans and Goals:     Continue with POC and progress toward PT goals.    Short Term Goals (4 Weeks):   1.  This patient will be idependent with initial HEP for L/E strengthening and knee ROM.  2.  This patient will Increase B knee AROM to -5* extension to 115 degrees flexion in order to rosibel his shoes with no difficulty.  3.  This patient will Increase B hip and knee MMT 1 full muscle grade all deficit motions in order to perform sit to stand transfers with no UE leverage.  4.  This patient will be able to ambulate greater than 100 feet with a normal gait pattern and no increase in symptoms.  5.  This patient will have a pain rating of 5/10 at  worst with ADLs.  6.  Patient will be able to achieve greater than or equal to 45 on the FOTO Knee Survey placing patient in 40%<60% impaired, limited, or restricted category demonstrating overall improved functional ability with lower extremity.     Long Term Goals (8 Weeks):   1.  This patient will be independent with updated HEP.  2.  This patient will increase B knee AROM to 0* extension to 120 degrees flexion in order to perform his usual ADLs with no increase in symptoms.   3.  This patient will have B LE strength of 5/5 in order to ascend/descend steps with a reciprocal gait pattern with no increase in symptoms.   4.  This patient will be able to ambulate community distances without use of assistive device without pain or gait deficits.  5. This patient will have a pain rating of 3/10 at worst with ADLs.  6. Patient will be able to achieve greater than or equal to 65 on the FOTO Knee Survey placing patient in 20%<40% impaired, limited, or restricted category demonstrating overall improved functional ability with lower extremity.

## 2017-09-14 ENCOUNTER — CLINICAL SUPPORT (OUTPATIENT)
Dept: REHABILITATION | Facility: HOSPITAL | Age: 52
End: 2017-09-14
Attending: INTERNAL MEDICINE
Payer: MEDICARE

## 2017-09-14 DIAGNOSIS — M25.661 DECREASED RANGE OF MOTION OF RIGHT KNEE: ICD-10-CM

## 2017-09-14 DIAGNOSIS — M25.662 DECREASED RANGE OF MOTION OF LEFT KNEE: ICD-10-CM

## 2017-09-14 DIAGNOSIS — M62.9 HAMSTRING TIGHTNESS OF BOTH LOWER EXTREMITIES: ICD-10-CM

## 2017-09-14 DIAGNOSIS — R29.898 WEAKNESS OF BOTH LOWER EXTREMITIES: ICD-10-CM

## 2017-09-14 DIAGNOSIS — R26.89 ANTALGIC GAIT: ICD-10-CM

## 2017-09-14 PROCEDURE — 97110 THERAPEUTIC EXERCISES: CPT

## 2017-09-14 NOTE — PROGRESS NOTES
"TIME RECORD    Date:  09/14/2017    Start Time:  8:00  Stop Time:  8:50    PROCEDURES:    TIMED  Procedure Min.   Bike sup 5 NC   TE 25   TE sup 20 NC             UNTIMED  Procedure Min.             Total Timed Minutes:  25  Total Timed Units:  2  Total Untimed Units:  0  Charges Billed/# of units:  2 (TE-2)      Progress/Current Status    Subjective:     Patient ID: Juan Carlos Hammer is a 52 y.o. male.  Diagnosis:   1. Decreased range of motion of left knee     2. Decreased range of motion of right knee     3. Weakness of both lower extremities     4. Antalgic gait     5. Hamstring tightness of both lower extremities       Pain: 7 /10  Patient reports his pain is the same, it never changes.    Objective:     Patient initiated the session on stationary bike x 5 minutes supervised by PTA.  Patient was educated and performed therapeutic exercises as per log below, along with today's progressions, 1:1 with PTA x 25 minutes and supervised exercises by PTA x 20 minutes to improve ROM, flexibility, strength and gait. Patient declined a cold pack at the end of today's session.     Date  09/14/17 09/12/17 09/08/17 09/06/17 08/31/17 08/29/17 08/24/17   VISIT  INS. AUTH. EXP. 8/12  10/16/17 7/12  10/16/17 6/12  10/16/17 5/12  10/16/17 4/12  10/16/17 3/12  10/16/17 2/12  10/16/17   G CODE VISIT 8/10 7/10 6/10 5/10 4/10 3/10 2/10   POC EXP. DATE 10/21/17 10/21/17 10/21/17 10/21/17 10/21/17 10/21/17 10/21/17   VISIT AMOUNT    MEDICARE TOTAL $63.96    $683.22 95.94    619.26 $95.94    $523.32 127.92    427.38 $63.96    $299.46 63.96    235.50 $63.96    $171.54   FACE-TO-FACE 09/21/17 09/21/197 09/21/17 09/21/17 09/21/17 09/21/17 09/21/17             Bike  L3 x 5' L3 x 5' 5' 5' 5' 5' 5'   TABLE:          HSS w/ strap 10 x 10" 10 x 10" 10 x 10" 10 x 10" 10 x 10" 10 x 10" 10 x 10"   Bridge  3 x 10 3 x 10 1 x 25 1 x 25 1 x 20 1 x 15 1 x 10   SLR 3 x 10 x 1# 3 x 10 x 1# 3 x 10 x 1# 2 x 10 x 1# 3 x 10 2 x 10 1 x 10   Hip abduction - SL 3 x 10 x " "1# 3 x 10 3 x 10 3 x 10 2 x 10 1 x 15 1 x 10   Hip adduction - SL 3 x 10 x ! 3 x 10 2 x 10 SL 1 x 10 20 x 3" w/ ball 15 x 3" w/ ball 10 x 3" w/ ball   Hip extension 3 x 10 x 1# 3 x 10 x 1# 2 x 10 x 1# 3 x 10 2 x 10 1 x 10 --   Prone hang 3' x 1# 3' x 1# 3' 3' 3' 3'    SAQ 3 x 10 x 1# 3 x 10 x 1# 3 x 10 x 1# 2 x 10 x 1# 3 x 10 2 x 10 1 x 15   Heel Slides 1 x 20 1 x 20 1 x 20 1 x 20 1 x 20 1 x 20 1 x 15   SEATED:          LAQs 3 x 10 x 1# 3 x 10 x 1# 2 x 10 x 1# 3 x 10 2 x 10 1 x 15 1 x 10   Seated Hip Flex 3 x 10 x 1# 3 x 10 x 1# 2 x 10 x 1# 3 x 10 2 x 10 1 x 15 1 x 10   HS curls 2 x 10 BTB  3 x 10 GTB 3 x 10 GTB 2 x 10 GTB 1 x 15 RTB 1 x 10 RTB   STANDING:          Mini squats 1 x 25 1 x 25 2 x 10 2 x 10 2 x 10 1 x 15 1 x 10   Heel raises 1 x 25 1 x 25 2 x 10 2 x 10 2 x 10 1 x 15 1 x 10   Hip Abd -- -- -- -- -- -- --   Hip Flex -- -- -- -- -- -- --   Hip Ext -- -- -- -- -- -- --   HS Curls -- -- -- -- -- -- --   Step Ups -- -- -- -- -- -- --             CP declined declined declined declined declined declined declined             Initials GWA 1/6 DG GWA 1/6 DG GWA 1/6 DG GWA 1/6       Assessment:     He continues to require cues to avoid using momentum with his exercises and positioning for side lying exercises.  Patient completed his therapy along with addition of weights to side lying exercises with no increase in symptoms prior to leaving the clinic.       Patient Education/Response:     Patient was instructed to continue to perform his HEP to his tolerance twice a day.  Patient verbalized understanding instructions.     Plans and Goals:     Continue with POC and progress toward PT goals.    Short Term Goals (4 Weeks):   1.  This patient will be idependent with initial HEP for L/E strengthening and knee ROM.  2.  This patient will Increase B knee AROM to -5* extension to 115 degrees flexion in order to rosibel his shoes with no difficulty.  3.  This patient will Increase B hip and knee MMT 1 full muscle grade all " deficit motions in order to perform sit to stand transfers with no UE leverage.  4.  This patient will be able to ambulate greater than 100 feet with a normal gait pattern and no increase in symptoms.  5.  This patient will have a pain rating of 5/10 at worst with ADLs.  6.  Patient will be able to achieve greater than or equal to 45 on the FOTO Knee Survey placing patient in 40%<60% impaired, limited, or restricted category demonstrating overall improved functional ability with lower extremity.     Long Term Goals (8 Weeks):   1.  This patient will be independent with updated HEP.  2.  This patient will increase B knee AROM to 0* extension to 120 degrees flexion in order to perform his usual ADLs with no increase in symptoms.   3.  This patient will have B LE strength of 5/5 in order to ascend/descend steps with a reciprocal gait pattern with no increase in symptoms.   4.  This patient will be able to ambulate community distances without use of assistive device without pain or gait deficits.  5. This patient will have a pain rating of 3/10 at worst with ADLs.  6. Patient will be able to achieve greater than or equal to 65 on the FOTO Knee Survey placing patient in 20%<40% impaired, limited, or restricted category demonstrating overall improved functional ability with lower extremity.

## 2017-09-19 ENCOUNTER — CLINICAL SUPPORT (OUTPATIENT)
Dept: REHABILITATION | Facility: HOSPITAL | Age: 52
End: 2017-09-19
Attending: INTERNAL MEDICINE
Payer: MEDICARE

## 2017-09-19 DIAGNOSIS — M62.9 HAMSTRING TIGHTNESS OF BOTH LOWER EXTREMITIES: ICD-10-CM

## 2017-09-19 DIAGNOSIS — M25.661 DECREASED RANGE OF MOTION OF RIGHT KNEE: ICD-10-CM

## 2017-09-19 DIAGNOSIS — R29.898 WEAKNESS OF BOTH LOWER EXTREMITIES: ICD-10-CM

## 2017-09-19 DIAGNOSIS — R26.89 ANTALGIC GAIT: ICD-10-CM

## 2017-09-19 DIAGNOSIS — M25.662 DECREASED RANGE OF MOTION OF LEFT KNEE: ICD-10-CM

## 2017-09-19 PROCEDURE — 97110 THERAPEUTIC EXERCISES: CPT

## 2017-09-19 NOTE — PROGRESS NOTES
"TIME RECORD    Date:  09/19/2017    Start Time:  8:00  Stop Time:  8:45    PROCEDURES:    TIMED  Procedure Min.   Bike sup 5 NC   TE 40                 UNTIMED  Procedure Min.             Total Timed Minutes:  40  Total Timed Units:  3  Total Untimed Units:  0  Charges Billed/# of units:  3 (TE-3)      Progress/Current Status    Subjective:     Patient ID: Juan Carlos Hammer is a 52 y.o. male.  Diagnosis:   1. Decreased range of motion of left knee     2. Decreased range of motion of right knee     3. Weakness of both lower extremities     4. Antalgic gait     5. Hamstring tightness of both lower extremities       Pain: 7 /10  He reports having increased pain this morning, but he cut grass and ran around the house a lot yesterday.    Objective:     Patient initiated the session on stationary bike x 5 minutes supervised by PT. Patient was educated and performed therapeutic exercises as per log below, along with today's progressions and new exercises, 1:1 with PT x 40 minutes to improve ROM, flexibility, strength and gait. Patient declined a cold pack at the end of today's session.     Date  09/19/17 09/14/17 09/12/17 09/08/17 09/06/17 08/31/17 08/29/17 08/24/17   VISIT  INS. AUTH. EXP. 9/12  10/16/17 8/12  10/16/17 7/12  10/16/17 6/12  10/16/17 5/12  10/16/17 4/12  10/16/17 3/12  10/16/17 2/12  10/16/17   G CODE VISIT 9/10 8/10 7/10 6/10 5/10 4/10 3/10 2/10   POC EXP. DATE 10/21/17 10/21/17 10/21/17 10/21/17 10/21/17 10/21/17 10/21/17 10/21/17   VISIT AMOUNT    MEDICARE TOTAL 95.94    779.16 $63.96    $683.22 95.94    619.26 $95.94    $523.32 127.92    427.38 $63.96    $299.46 63.96    235.50 $63.96    $171.54   FACE-TO-FACE 09/21/17 09/21/17 09/21/197 09/21/17 09/21/17 09/21/17 09/21/17 09/21/17              Bike  L3 hill x 5' L3 x 5' L3 x 5' 5' 5' 5' 5' 5'   TABLE:           HSS w/ strap 10 x 10" 10 x 10" 10 x 10" 10 x 10" 10 x 10" 10 x 10" 10 x 10" 10 x 10"   Bridge  SL 1 x 10 3 x 10 3 x 10 1 x 25 1 x 25 1 x 20 1 x 15 1 x " "10   SLR 3 x 10 x 1.5# 3 x 10 x 1# 3 x 10 x 1# 3 x 10 x 1# 2 x 10 x 1# 3 x 10 2 x 10 1 x 10   Hip abduction - SL 3 x 10 x 1.5# 3 x 10 x 1# 3 x 10 3 x 10 3 x 10 2 x 10 1 x 15 1 x 10   Hip adduction - SL 3 x 10 x 1.5# 3 x 10 x 1# 3 x 10 2 x 10 SL 1 x 10 20 x 3" w/ ball 15 x 3" w/ ball 10 x 3" w/ ball   Hip extension 3 x 10 x 1.5# 3 x 10 x 1# 3 x 10 x 1# 2 x 10 x 1# 3 x 10 2 x 10 1 x 10 --   Prone hang 3' x 1.5# 3' x 1# 3' x 1# 3' 3' 3' 3'    SAQ 3 x 10 x 1.5# 3 x 10 x 1# 3 x 10 x 1# 3 x 10 x 1# 2 x 10 x 1# 3 x 10 2 x 10 1 x 15   Heel Slides -- 1 x 20 1 x 20 1 x 20 1 x 20 1 x 20 1 x 20 1 x 15   Prone HS curl 2 x 10 x 1.5#          SEATED:           LAQs 3 x 10 x 1.5# 3 x 10 x 1# 3 x 10 x 1# 2 x 10 x 1# 3 x 10 2 x 10 1 x 15 1 x 10   Seated Hip Flex 3 x 10 1.5# 3 x 10 x 1# 3 x 10 x 1# 2 x 10 x 1# 3 x 10 2 x 10 1 x 15 1 x 10   HS curls -- 2 x 10 BTB  3 x 10 GTB 3 x 10 GTB 2 x 10 GTB 1 x 15 RTB 1 x 10 RTB   STANDING:           Mini squats 3 x 10  1 x 25 1 x 25 2 x 10 2 x 10 2 x 10 1 x 15 1 x 10   Heel raises 3 x 10  1 x 25 1 x 25 2 x 10 2 x 10 2 x 10 1 x 15 1 x 10   Hip Abd -- -- -- -- -- -- -- --   Hip Flex -- -- -- -- -- -- -- --   Hip Ext -- -- -- -- -- -- -- --   HS Curls -- -- -- -- -- -- -- --   Step Ups str & lat L1 1 x 10  -- -- -- -- -- -- --              CP declined declined declined declined declined declined declined declined              Initials DG GWA 1/6 DG GWA 1/6 DG GWA 1/6 DG GWA 1/6       Assessment:     Patient continues to require cues to avoid using momentum with his exercises. He was able to perform all of today's progressions and new exercises with no increase in symptoms prior to leaving the clinic.     Patient Education/Response:     Patient was instructed to continue to perform his HEP to his tolerance twice a day.  Patient verbalized understanding instructions.     Plans and Goals:     Continue with POC and progress toward PT goals.    Short Term Goals (4 Weeks):   1.  This patient will be " idependent with initial HEP for L/E strengthening and knee ROM.  2.  This patient will Increase B knee AROM to -5* extension to 115 degrees flexion in order to rosibel his shoes with no difficulty.  3.  This patient will Increase B hip and knee MMT 1 full muscle grade all deficit motions in order to perform sit to stand transfers with no UE leverage.  4.  This patient will be able to ambulate greater than 100 feet with a normal gait pattern and no increase in symptoms.  5.  This patient will have a pain rating of 5/10 at worst with ADLs.  6.  Patient will be able to achieve greater than or equal to 45 on the FOTO Knee Survey placing patient in 40%<60% impaired, limited, or restricted category demonstrating overall improved functional ability with lower extremity.     Long Term Goals (8 Weeks):   1.  This patient will be independent with updated HEP.  2.  This patient will increase B knee AROM to 0* extension to 120 degrees flexion in order to perform his usual ADLs with no increase in symptoms.   3.  This patient will have B LE strength of 5/5 in order to ascend/descend steps with a reciprocal gait pattern with no increase in symptoms.   4.  This patient will be able to ambulate community distances without use of assistive device without pain or gait deficits.  5. This patient will have a pain rating of 3/10 at worst with ADLs.  6. Patient will be able to achieve greater than or equal to 65 on the FOTO Knee Survey placing patient in 20%<40% impaired, limited, or restricted category demonstrating overall improved functional ability with lower extremity.

## 2017-09-21 ENCOUNTER — DOCUMENTATION ONLY (OUTPATIENT)
Dept: REHABILITATION | Facility: HOSPITAL | Age: 52
End: 2017-09-21

## 2017-09-21 ENCOUNTER — CLINICAL SUPPORT (OUTPATIENT)
Dept: REHABILITATION | Facility: HOSPITAL | Age: 52
End: 2017-09-21
Attending: INTERNAL MEDICINE
Payer: MEDICARE

## 2017-09-21 DIAGNOSIS — R29.898 WEAKNESS OF BOTH LOWER EXTREMITIES: ICD-10-CM

## 2017-09-21 DIAGNOSIS — M25.661 DECREASED RANGE OF MOTION OF RIGHT KNEE: ICD-10-CM

## 2017-09-21 DIAGNOSIS — M62.9 HAMSTRING TIGHTNESS OF BOTH LOWER EXTREMITIES: ICD-10-CM

## 2017-09-21 DIAGNOSIS — M25.662 DECREASED RANGE OF MOTION OF LEFT KNEE: ICD-10-CM

## 2017-09-21 DIAGNOSIS — R26.89 ANTALGIC GAIT: ICD-10-CM

## 2017-09-21 PROCEDURE — G8978 MOBILITY CURRENT STATUS: HCPCS | Mod: CK

## 2017-09-21 PROCEDURE — G8979 MOBILITY GOAL STATUS: HCPCS | Mod: CJ

## 2017-09-21 PROCEDURE — 97110 THERAPEUTIC EXERCISES: CPT

## 2017-09-21 NOTE — PROGRESS NOTES
"TIME RECORD    Date:  09/21/2017    Start Time:  8:00  Stop Time:  8:50    PROCEDURES:    TIMED  Procedure Min.   Bike sup 5 NC   TE 25   TE sup 20NC             UNTIMED  Procedure Min.             Total Timed Minutes:  25  Total Timed Units:  2  Total Untimed Units:  0  Charges Billed/# of units:  2 (TE-2)      Progress/Current Status    Subjective:     Patient ID: Juan Carlos Hammer is a 52 y.o. male.  Diagnosis:   1. Decreased range of motion of left knee     2. Decreased range of motion of right knee     3. Weakness of both lower extremities     4. Antalgic gait     5. Hamstring tightness of both lower extremities       Pain: 7 /10  He reports having more pain in his R knee pain, "it ain't never stopped from the other day".    Objective:     Patient initiated the session on stationary bike x 5 minutes supervised by PT. Patient was educated and performed therapeutic exercises as per log below, along with today's progressions, supervised by PT x 20 minutes and 1:1 with PT x 25 minutes to improve ROM, flexibility, strength and gait. Patient declined a cold pack at the end of today's session.     Date  09/21/17 09/19/17 09/14/17 09/12/17 09/08/17 09/06/17 08/31/17 08/29/17 08/24/17   VISIT  INS. AUTH. EXP. 10/12  10/16/17 9/12  10/16/17 8/12  10/16/17 7/12  10/16/17 6/12  10/16/17 5/12  10/16/17 4/12  10/16/17 3/12  10/16/17 2/12  10/16/17   G CODE VISIT 1/10 9/10 8/10 7/10 6/10 5/10 4/10 3/10 2/10   POC EXP. DATE 10/21/17 10/21/17 10/21/17 10/21/17 10/21/17 10/21/17 10/21/17 10/21/17 10/21/17   VISIT AMOUNT    MEDICARE TOTAL 63.96    843.12 95.94    779.16 $63.96    $683.22 95.94    619.26 $95.94    $523.32 127.92    427.38 $63.96    $299.46 63.96    235.50 $63.96    $171.54   FACE-TO-FACE 10/21/17 09/21/17 09/21/17 09/21/197 09/21/17 09/21/17 09/21/17 09/21/17 09/21/17               Bike  L5 hill x 5' L3 hill x 5' L3 x 5' L3 x 5' 5' 5' 5' 5' 5'   TABLE:            HSS w/ strap 10 x 10" 10 x 10" 10 x 10" 10 x 10" 10 x 10" 10 " "x 10" 10 x 10" 10 x 10" 10 x 10"   Bridge  SL 2 x 10 SL 1 x 10 3 x 10 3 x 10 1 x 25 1 x 25 1 x 20 1 x 15 1 x 10   SLR 3 x 10 x 1.5# 3 x 10 x 1.5# 3 x 10 x 1# 3 x 10 x 1# 3 x 10 x 1# 2 x 10 x 1# 3 x 10 2 x 10 1 x 10   Hip abduction - SL 3 x 10 x 1.5# 3 x 10 x 1.5# 3 x 10 x 1# 3 x 10 3 x 10 3 x 10 2 x 10 1 x 15 1 x 10   Hip adduction - SL 3 x 10 x 1.5# 3 x 10 x 1.5# 3 x 10 x 1# 3 x 10 2 x 10 SL 1 x 10 20 x 3" w/ ball 15 x 3" w/ ball 10 x 3" w/ ball   Hip extension 3 x 10 x 1.5# 3 x 10 x 1.5# 3 x 10 x 1# 3 x 10 x 1# 2 x 10 x 1# 3 x 10 2 x 10 1 x 10 --   Prone hang 3' x 1.5# 3' x 1.5# 3' x 1# 3' x 1# 3' 3' 3' 3'    SAQ 3 x 10 x 1.5# 3 x 10 x 1.5# 3 x 10 x 1# 3 x 10 x 1# 3 x 10 x 1# 2 x 10 x 1# 3 x 10 2 x 10 1 x 15   Heel Slides -- -- 1 x 20 1 x 20 1 x 20 1 x 20 1 x 20 1 x 20 1 x 15   Prone HS curl 3 x 10 x 1.5# 2 x 10 x 1.5#          SEATED:            LAQs 3 x 10 x 1.5# 3 x 10 x 1.5# 3 x 10 x 1# 3 x 10 x 1# 2 x 10 x 1# 3 x 10 2 x 10 1 x 15 1 x 10   Seated Hip Flex 3 x 10 x 1.5# 3 x 10 1.5# 3 x 10 x 1# 3 x 10 x 1# 2 x 10 x 1# 3 x 10 2 x 10 1 x 15 1 x 10   HS curls -- -- 2 x 10 BTB  3 x 10 GTB 3 x 10 GTB 2 x 10 GTB 1 x 15 RTB 1 x 10 RTB   STANDING:            Mini squats 3 x 10 3 x 10  1 x 25 1 x 25 2 x 10 2 x 10 2 x 10 1 x 15 1 x 10   Heel raises 3 x 10 3 x 10  1 x 25 1 x 25 2 x 10 2 x 10 2 x 10 1 x 15 1 x 10   Hip Abd -- -- -- -- -- -- -- -- --   Hip Flex -- -- -- -- -- -- -- -- --   Hip Ext -- -- -- -- -- -- -- -- --   HS Curls -- -- -- -- -- -- -- -- --   Step Ups str & lat L1 2 x10 L1 1 x 10  -- -- -- -- -- -- --               CP declined declined declined declined declined declined declined declined declined               Initials DG DG GWA 1/6 DG GWA 1/6 DG GWA 1/6 DG GWA 1/6     FOTO Knee Survey 42, G code CK, 40%<60%  Assessment:     He continues to require cues to perform his exercises correctly and for positioning with side lying exercises. He was able to perform all of today's progressions with no increase in " symptoms.  His current score on FOTO Knee Survey places him in the 40%<60% impaired, limited, or restricted category.     Patient Education/Response:     Patient was instructed to continue to perform his HEP to his tolerance twice a day.  Patient verbalized understanding instructions.     Plans and Goals:     Continue with POC and progress toward PT goals.    Short Term Goals (4 Weeks):   1.  This patient will be idependent with initial HEP for L/E strengthening and knee ROM.  2.  This patient will Increase B knee AROM to -5* extension to 115 degrees flexion in order to rosibel his shoes with no difficulty.  3.  This patient will Increase B hip and knee MMT 1 full muscle grade all deficit motions in order to perform sit to stand transfers with no UE leverage.  4.  This patient will be able to ambulate greater than 100 feet with a normal gait pattern and no increase in symptoms.  5.  This patient will have a pain rating of 5/10 at worst with ADLs.  6.  Patient will be able to achieve greater than or equal to 45 on the FOTO Knee Survey placing patient in 40%<60% impaired, limited, or restricted category demonstrating overall improved functional ability with lower extremity.     Long Term Goals (8 Weeks):   1.  This patient will be independent with updated HEP.  2.  This patient will increase B knee AROM to 0* extension to 120 degrees flexion in order to perform his usual ADLs with no increase in symptoms.   3.  This patient will have B LE strength of 5/5 in order to ascend/descend steps with a reciprocal gait pattern with no increase in symptoms.   4.  This patient will be able to ambulate community distances without use of assistive device without pain or gait deficits.  5. This patient will have a pain rating of 3/10 at worst with ADLs.  6. Patient will be able to achieve greater than or equal to 65 on the FOTO Knee Survey placing patient in 20%<40% impaired, limited, or restricted category demonstrating overall  improved functional ability with lower extremity.

## 2017-09-21 NOTE — PROGRESS NOTES
Face to Face PTA Conference performed with Marcelo Herr, PTA regarding patient's current status, overall progress, and plan of care      Face to Face PTA Conference performed with Chuyita Ledezma, PT regarding patient's current status, overall progress, and plan of care    Marcelo Herr  9/21/2017

## 2017-10-05 ENCOUNTER — CLINICAL SUPPORT (OUTPATIENT)
Dept: REHABILITATION | Facility: HOSPITAL | Age: 52
End: 2017-10-05
Attending: INTERNAL MEDICINE
Payer: MEDICARE

## 2017-10-05 DIAGNOSIS — R29.898 WEAKNESS OF LEFT UPPER EXTREMITY: ICD-10-CM

## 2017-10-05 DIAGNOSIS — M25.661 DECREASED RANGE OF MOTION OF RIGHT KNEE: ICD-10-CM

## 2017-10-05 DIAGNOSIS — M25.612 DECREASED RANGE OF MOTION OF LEFT SHOULDER: ICD-10-CM

## 2017-10-05 DIAGNOSIS — R29.898 WEAKNESS OF BOTH LOWER EXTREMITIES: ICD-10-CM

## 2017-10-05 DIAGNOSIS — R26.89 ANTALGIC GAIT: ICD-10-CM

## 2017-10-05 DIAGNOSIS — M25.662 DECREASED RANGE OF MOTION OF LEFT KNEE: ICD-10-CM

## 2017-10-05 DIAGNOSIS — R52 PAIN AGGRAVATED BY ACTIVITIES OF DAILY LIVING: ICD-10-CM

## 2017-10-05 DIAGNOSIS — M62.9 HAMSTRING TIGHTNESS OF BOTH LOWER EXTREMITIES: ICD-10-CM

## 2017-10-05 PROCEDURE — 97110 THERAPEUTIC EXERCISES: CPT

## 2017-10-05 PROCEDURE — G8979 MOBILITY GOAL STATUS: HCPCS | Mod: CJ

## 2017-10-05 PROCEDURE — G8980 MOBILITY D/C STATUS: HCPCS | Mod: CK

## 2017-10-05 PROCEDURE — G8984 CARRY CURRENT STATUS: HCPCS | Mod: CK

## 2017-10-05 PROCEDURE — G8985 CARRY GOAL STATUS: HCPCS | Mod: CJ

## 2017-10-05 NOTE — PROGRESS NOTES
TIME RECORD    Date:  10/05/2017    Start Time:  8:00  Stop Time:  9:00    PROCEDURES:    TIMED  Procedure Min.       TE 30   TE sup 30NC             UNTIMED  Procedure Min.             Total Timed Minutes:  30  Total Timed Units:  2  Total Untimed Units:  0  Charges Billed/# of units:  2 (TE-2)      Progress/Current Status    Subjective:     Patient ID: Juan Carlos Hammer is a 52 y.o. male.  Diagnosis:   1. Decreased range of motion of left knee     2. Decreased range of motion of right knee     3. Weakness of both lower extremities     4. Antalgic gait     5. Hamstring tightness of both lower extremities       Pain: 7 /10  He reports he has not been to therapy for almost two weeks because his van was out and he had to work on it. His knee has been feeling better since starting therapy but his shoulder continues to hurt.    Objective:     Patient was educated and performed therapeutic exercises as per log below, along with today's reassessment of his knees and evaluation of his L shoulder, 1:1 with PT x 30 minutes and supervised by PT x 30 minutes to improve ROM, flexibility, strength and gait. Patient declined a cold pack at the end of today's session.     Date  10/05/17 09/21/17 09/19/17 09/14/17 09/12/17 09/08/17 09/06/17 08/31/17 08/29/17 08/24/17   VISIT  INS. AUTH. EXP. 11/12  10/16/17 10/12  10/16/17 9/12  10/16/17 8/12  10/16/17 7/12  10/16/17 6/12  10/16/17 5/12  10/16/17 4/12  10/16/17 3/12  10/16/17 2/12  10/16/17   G CODE VISIT D/C 1/10 9/10 8/10 7/10 6/10 5/10 4/10 3/10 2/10   POC EXP. DATE D/C 10/21/17 10/21/17 10/21/17 10/21/17 10/21/17 10/21/17 10/21/17 10/21/17 10/21/17   VISIT AMOUNT    MEDICARE TOTAL 63.96    907.08 63.96    843.12 95.94    779.16 $63.96    $683.22 95.94    619.26 $95.94    $523.32 127.92    427.38 $63.96    $299.46 63.96    235.50 $63.96    $171.54   FACE-TO-FACE 10/21/17 10/21/17 09/21/17 09/21/17 09/21/197 09/21/17 09/21/17 09/21/17 09/21/17 09/21/17                [place]  D/C L5 hill x  "5' L3 hill x 5' L3 x 5' L3 x 5' 5' 5' 5' 5' 5'   TABLE:             HSS w/ strap @ home 10 x 10" 10 x 10" 10 x 10" 10 x 10" 10 x 10" 10 x 10" 10 x 10" 10 x 10" 10 x 10"   Bridge  @home SL 2 x 10 SL 1 x 10 3 x 10 3 x 10 1 x 25 1 x 25 1 x 20 1 x 15 1 x 10   SLR 3 x 10 x 1.5# 3 x 10 x 1.5# 3 x 10 x 1.5# 3 x 10 x 1# 3 x 10 x 1# 3 x 10 x 1# 2 x 10 x 1# 3 x 10 2 x 10 1 x 10   Hip abduction - SL 3 x 10 x 1.5# 3 x 10 x 1.5# 3 x 10 x 1.5# 3 x 10 x 1# 3 x 10 3 x 10 3 x 10 2 x 10 1 x 15 1 x 10   Hip adduction - SL 3 x 10 x 1.5# 3 x 10 x 1.5# 3 x 10 x 1.5# 3 x 10 x 1# 3 x 10 2 x 10 SL 1 x 10 20 x 3" w/ ball 15 x 3" w/ ball 10 x 3" w/ ball   Hip extension 3 x 10 x 1.5# 3 x 10 x 1.5# 3 x 10 x 1.5# 3 x 10 x 1# 3 x 10 x 1# 2 x 10 x 1# 3 x 10 2 x 10 1 x 10 --   Prone hang 4' x 1.5# 3' x 1.5# 3' x 1.5# 3' x 1# 3' x 1# 3' 3' 3' 3'    SAQ oot 3 x 10 x 1.5# 3 x 10 x 1.5# 3 x 10 x 1# 3 x 10 x 1# 3 x 10 x 1# 2 x 10 x 1# 3 x 10 2 x 10 1 x 15   Heel Slides -- -- -- 1 x 20 1 x 20 1 x 20 1 x 20 1 x 20 1 x 20 1 x 15   Prone HS curl 3 x 10 x 1.5# 3 x 10 x 1.5# 2 x 10 x 1.5#          SEATED:             LAQs 3 x 10 x 1.5# 3 x 10 x 1.5# 3 x 10 x 1.5# 3 x 10 x 1# 3 x 10 x 1# 2 x 10 x 1# 3 x 10 2 x 10 1 x 15 1 x 10   Seated Hip Flex 3 x 10 x 1.5# 3 x 10 x 1.5# 3 x 10 1.5# 3 x 10 x 1# 3 x 10 x 1# 2 x 10 x 1# 3 x 10 2 x 10 1 x 15 1 x 10   HS curls -- -- -- 2 x 10 BTB  3 x 10 GTB 3 x 10 GTB 2 x 10 GTB 1 x 15 RTB 1 x 10 RTB   STANDING:             Mini squats 3 x 10 3 x 10 3 x 10  1 x 25 1 x 25 2 x 10 2 x 10 2 x 10 1 x 15 1 x 10   Heel raises 3 x 10 3 x 10 3 x 10  1 x 25 1 x 25 2 x 10 2 x 10 2 x 10 1 x 15 1 x 10   Hip Abd -- -- -- -- -- -- -- -- -- --   Hip Flex --- -- -- -- -- -- -- -- -- --   Hip Ext -- -- -- -- -- -- -- -- -- --   HS Curls -- -- -- -- -- -- -- -- -- --   Step Ups str & lat L2 2 x 10 L1 2 x10 L1 1 x 10  -- -- -- -- -- -- --                CP declined declined declined declined declined declined declined declined declined declined     "            Initials DG DG DG GWA 1/6 DG GWA 1/6 DG GWA 1/6 DG GWA 1/6       Hip Right  Left  Pain/Dysfunction with Movement    AROM MMT AROM MMT    Flexion WFL 3/5 WFL 3/5    Extension NT 3/5 NT 3/5    Abduction WFL 3/5 WFL 3/5       Knee  Right   Left  Pain/Dysfunction with Movement    AROM PROM MMT AROM PROM MMT    Flexion 110 120 3/5 112 125 3+/5    Extension -20 -10 3+/5 -15 -5 3/5      Ankle Right  Left  Pain/Dysfunction with Movement    AROM MMT AROM MMT    Plantarflexion WFL 5/5 WFL 5/5    Dorsiflexion WFL 5/5 WFL 5/5      FOTO Knee 58, G code CK, 40%<60%    Assessment:     During his reassessment the patient demonstrated grossly 3/5 strength in BLE but was able to perform all of his usual mat exercises with 1.5 lb ankle weights with no difficulty. He continues to have limited knee extension bilaterally due to complaints of pain with fully extending his knees. He also continues to report 7/10 pain in his knees but has reported doing yard work at home with no difficulty. His current score on FOTO Knee Survey places him in the 40%<60% impaired, limited, or restricted category. He continues to require frequent cues to perform his HEP correctly despite reporting he is doing his HEP on a daily basis. He has reached his max rehab potential at this time and is ready to be discharged from physical therapy for his B knees.     Patient Education/Response:     Patient was instructed to continue to perform his HEP to his tolerance twice a day.  Patient verbalized understanding instructions.     Plans and Goals:     Continue with POC and progress toward PT goals.    Short Term Goals (4 Weeks):   1.  This patient will be idependent with initial HEP for L/E strengthening and knee ROM. PARTIALLY MET  2.  This patient will Increase B knee AROM to -5* extension to 115 degrees flexion in order to rosibel his shoes with no difficulty. NOT MET  3.  This patient will Increase B hip and knee MMT 1 full muscle grade all deficit motions  in order to perform sit to stand transfers with no UE leverage. PARTIALLY MET, ABLE TO PERFORM SIT TO STAND WITHOUT UE LEVERAGE  4.  This patient will be able to ambulate greater than 100 feet with a normal gait pattern and no increase in symptoms. NOT MET  5.  This patient will have a pain rating of 5/10 at worst with ADLs. NOT MET  6.  Patient will be able to achieve greater than or equal to 45 on the FOTO Knee Survey placing patient in 40%<60% impaired, limited, or restricted category demonstrating overall improved functional ability with lower extremity. MET    Long Term Goals (8 Weeks):   1.  This patient will be independent with updated HEP. PARTIALLY MET  2.  This patient will increase B knee AROM to 0* extension to 120 degrees flexion in order to perform his usual ADLs with no increase in symptoms. NOT MET  3.  This patient will have B LE strength of 5/5 in order to ascend/descend steps with a reciprocal gait pattern with no increase in symptoms. PARTIALLY MET  4.  This patient will be able to ambulate community distances without use of assistive device without pain or gait deficits. NOT MET  5. This patient will have a pain rating of 3/10 at worst with ADLs. NOT MET  6. Patient will be able to achieve greater than or equal to 65 on the FOTO Knee Survey placing patient in 20%<40% impaired, limited, or restricted category demonstrating overall improved functional ability with lower extremity. NOT MET

## 2017-10-05 NOTE — PLAN OF CARE
Date: 10/08/2017    OUTPATIENT PHYSICAL THERAPY   PATIENT EVALUATION  Onset Date: 08/03/17  Primary Diagnosis:   1. Decreased AROM L shoulder  2. Weakness L UE  3. Pain with ADLs  Treatment Diagnosis: decreased ROM L shoulder, weakness L UE, pain with ADLs  Past Medical History:   Diagnosis Date    Diabetes mellitus     Hypertension      Precautions: Standard  Prior Therapy: Pt previously seen in this clinic for PT for B knees  Medications: Juan Carlos Hammer has a current medication list which includes the following prescription(s): atorvastatin, metformin, and tramadol-acetaminophen 37.5-325 mg.  Nutrition:  Overweight  History of Present Illness: Pt report falling on 8/3/17 and hitting his L shoulder  Prior Level of Function: Independent  Social History: Lives with significant other  Place of Residence (Steps/Adaptations): Single story home, 3 steps outside the home no hand rails  Functional Deficits Leading to Referral/Nature of Injury: difficulty sleeping, reaching into a cabinet, dressing  Patient Therapy Goals: to get my shoulder better    Subjective     Juan Carlos Hammre states he was coming down the stairs and fell hit shoulder. He did go to the ER that day and was told there were no fractures or dislocations of his shoulder. He denies any previous shoulder injuries or difficulty with ADLs prior to the fall.  Currently he reports having trouble with everything and sleeping on his L side due to the pain. He has to use his R hand to lift his L arm up to get something in the cabinet due to the pain. He also reports having more trouble than normal to putting on a shirt but denies any difficulty washing his hair or bathing.  He also denies any difficulty driving his van or with vehicle transfers, but reports having difficulty putting much weight on his shoulder.     Pain:  Location: shoulder   Description: Aching  Activities Which Increase Pain: Laying, Lifting and Getting out of bed/chair  Activities Which Decrease Pain: pain  medication  Pain Scale: 6/10 at best 7/10 now  10/10 at worst    Objective     Posture: forward shoulder, forward head, increased thoracic kyphosis  Palpation: diffuse tenderness to palpation L UT, middle, and lower trap, anterior deltoid, lateral and posterior deltoid, supraspinatus, infraspinatus, and L latissimus dorsi, L AC joint and along L clavicle  Sensation: light touch intact  DTRs: intact  Range of Motion/Strength: Initially during MMT patient demonstrated 3/5 R abd and flexion but when asked about the weakness he was able to demonstrate 5/5 strength    Shoulder  Right   Left  Pain/Dysfunction with Movement    AROM PROM MMT AROM PROM MMT    Flexion 160 170 5/5 60 170 2-/5    Extension NT NT 5/5 NT NT 5/5    Abduction 165 170 5/5 75 170 2-/5    IR@90*abd 85 90 NT 85 90 NT    ER@90*abd 75 85 NT 70 80 NT    IR@0*abd NT NT 5/5 NT NT 5/5    ER@0*abd NT NT 5/5 NT NT 3/5       Elbow Right  Left  Pain/Dysfunction with Movement    AROM MMT AROM MMT    Flexion WFL 5/5 WFL 5/5    Extension WFL 5/5 WFL 5/5        Flexibility: B pec major tightness  Gait: Without AD  Analysis: Assistance none,  antalgic gait, decreased knee extension, hip extension  Bed Mobility:Independent  Transfers: Independent  Special Tests: Unable to assess any special tests as patient had complaints of pain with all attempts at positioning for Neer, Fink-Kaushik, Empty Can, Lift off, Drop Arm   Other: N/A    Functional Limitation Reports: G codes  Tool: FOTO Shoulder SURVEY  Category: Carrying ()  Limitation: 56%  Current: CK = at least 40% but < 60% impaired, limited or restricted  Goal: CJ = at least 20% but < 40% impaired, limited or restricted    TREATMENT     PT Evaluation Completed? Yes  Discussed Plan of Care with patient: Yes    Juan Carlos received 5 minutes of therapeutic exercise & instruction including:  Wand flexion and abduction, wall walks    Written Home Exercises Provided: wall walks, wand flexion and wand abduction  Juan Carlos fletcher  fair understanding of the education provided. Patient demo fair return demo of skill of exercises.      Assessment       Initial Assessment (Pertinent finding, problem list and factors affecting outcome): Mr. Hammer is a 52 year old male, who presents to the clinic with complaints of L shoulder pain following a fall on his L arm. He demonstrated decreased AROM of his L shoulder with complaints of pain with all movement but has full PROM with complaints of pain with all movements. He was able to lie prone with his L arm full flexed above his head with no complaints during his session with his prone hip extension and knee flexion exercises. His decreased AROM limits his ability to perform his usual ADLs without an increase in pain. He also demonstrates L UE weakness that limits his ability to perform his usual ADLs without an increase in symptoms. He was unable to tolerate any positioning for special tests due to complaints of pain. He has diffuse tenderness to palpation all over his L UE. His current score on FOTO Shoulder places him in the 40%<60% impaired, limited, or restricted category. He would benefit from physical therapy to improve his strength, ROM, and functional mobility in order to return to his PLOF.    History  Co-morbidities and personal factors that may impact the plan of care Examination  Body Structures and Functions, activity limitations and participation restrictions that may impact the plan of care    Clinical Presentation   Co-morbidities:   level of undertstanding of current condition and OA        Personal Factors:   no deficits Body Regions:   upper extremities    Body Systems:    ROM  strength            Participation Restrictions:   Difficulty with all ADLs     Activity limitations:   Learning and applying knowledge  no deficits    General Tasks and Commands  no deficits    Communication  no deficits    Mobility  lifting and carrying objects    Self care  dressing    Domestic Life  doing house  work (cleaning house, washing dishes, laundry)    Interactions/Relationships  no deficits    Life Areas  no deficits    Community and Social Life  no deficits         evolving clinical presentation with changing clinical characteristics                      Low complexity   moderate  low Decision Making/ Complexity Score:  FOTO 56%       Rehab Potiential: fair  Barriers to Rehab: None  Short Term Goals (4 Weeks):   1. This patient will be independent with a basic HEP.  2. This patient will increase L shoulder AROM by 25* in order to dress with less difficulty.  3. This patient will increase L UE strength by 1 grade in order to reach a shelf at shoulder height.   4. This patient will have a pain rating of 5/10 at worst with ADLs.  5. Patient will be able to achieve greater than or equal to 60 on the FOTO Shoulder Survey placing patient in 40%<60% impaired, limited, or restricted category demonstrating overall improved functional ability with upper extremity.   Long Term Goals (8 Weeks):   1. This patient will be independent with an updated HEP.  2. This patient will have L shoulder AROM equal to R shoulder AROM in order to perform overhead activities.  3. This patient will increase L UE strength to 4/5 in order to perform his usual ADLs with no difficulty.   4. This patient will have a pain rating of 3/10 at worst with ADLs.  5. Patient will be able to achieve greater than or equal to 75 on the FOTO Shoulder Survey placing patient in 20%<40% impaired, limited, or restricted category demonstrating overall improved functional ability with upper extremity.     Plan     Certification Period: 10/05/17 to 12/05/17  Recommended Treatment Plan: 2 times per week for 8 weeks: Group Therapy, Manual Therapy, Moist Heat/ Ice, Patient Education and Therapeutic Exercise  Other Recommendations: modalities prn, IASTM prn, kinesiotape prn, Functional Dry Needling prn       Therapist: Chuyita Ledezma, PT    I CERTIFY THE NEED FOR  THESE SERVICES FURNISHED UNDER THIS PLAN OF TREATMENT AND WHILE UNDER MY CARE    Physician's comments: ________________________________________________________________________________________________________________________________________________      Physician's Name: ___________________________________

## 2017-10-08 PROBLEM — R29.898 WEAKNESS OF LEFT UPPER EXTREMITY: Status: ACTIVE | Noted: 2017-10-08

## 2017-10-08 PROBLEM — M25.612 DECREASED RANGE OF MOTION OF LEFT SHOULDER: Status: ACTIVE | Noted: 2017-10-08

## 2017-10-08 PROBLEM — R29.898 WEAKNESS OF BOTH LOWER EXTREMITIES: Status: RESOLVED | Noted: 2017-08-21 | Resolved: 2017-10-08

## 2017-10-08 PROBLEM — R52 PAIN AGGRAVATED BY ACTIVITIES OF DAILY LIVING: Status: ACTIVE | Noted: 2017-10-08

## 2017-10-08 PROBLEM — M25.662 DECREASED RANGE OF MOTION OF LEFT KNEE: Status: RESOLVED | Noted: 2017-08-21 | Resolved: 2017-10-08

## 2017-10-08 PROBLEM — M25.661 DECREASED RANGE OF MOTION OF RIGHT KNEE: Status: RESOLVED | Noted: 2017-08-21 | Resolved: 2017-10-08

## 2017-10-08 PROBLEM — M62.9 HAMSTRING TIGHTNESS OF BOTH LOWER EXTREMITIES: Status: RESOLVED | Noted: 2017-08-21 | Resolved: 2017-10-08

## 2017-10-08 PROBLEM — R26.89 ANTALGIC GAIT: Status: RESOLVED | Noted: 2017-08-21 | Resolved: 2017-10-08

## 2017-10-09 ENCOUNTER — CLINICAL SUPPORT (OUTPATIENT)
Dept: REHABILITATION | Facility: HOSPITAL | Age: 52
End: 2017-10-09
Attending: INTERNAL MEDICINE
Payer: MEDICARE

## 2017-10-09 DIAGNOSIS — M25.612 DECREASED RANGE OF MOTION OF LEFT SHOULDER: ICD-10-CM

## 2017-10-09 DIAGNOSIS — R29.898 WEAKNESS OF LEFT UPPER EXTREMITY: ICD-10-CM

## 2017-10-09 DIAGNOSIS — R52 PAIN AGGRAVATED BY ACTIVITIES OF DAILY LIVING: ICD-10-CM

## 2017-10-09 NOTE — PROGRESS NOTES
TIME RECORD    Date:  10/09/2017    Start Time:  8:00  Stop Time:  8:15    PROCEDURES:    TIMED  Procedure Min.       TE sup 12 NC   Pulleys sup 3 NC             UNTIMED  Procedure Min.             Total Timed Minutes:  0  Total Timed Units:  0  Total Untimed Units:  0  Charges Billed/# of units:  0      Progress/Current Status    Subjective:     Patient ID: Juan Carlos Hammer is a 52 y.o. male.  Diagnosis:   1. Decreased range of motion of left shoulder     2. Weakness of left upper extremity     3. Pain aggravated by activities of daily living       Pain: 7 /10  Patient reports having a lot of pain in his left shoulder.    Objective:     Patient was educated and performed therapeutic exercises as per log below supervised by PTA x 12 minutes and pulleys x 3 minutes.    Date  10/09/17   VISIT    G CODE VISIT 2/10   POC EXP. DATE 12/05/17   VISIT AMOUNT    MEDICARE TOTAL    FACE-TO-FACE 11/05/17       Pulleys - flex 3'   TABLE:    Wand   - flex  - abd   1 x 10  --           STANDING:    Walls walks - flex 1 x 10                               CP declined       Initials GWA 1/6       Assessment:     Patient was very limited with all movements.     Patient Education/Response:     Patient was instructed to continue with HEP.  Patient verbalized understanding instructions.    Plans and Goals:     Continue with plan of care and progress toward PT goals as patient tolerates.    Short Term Goals (4 Weeks):   1. This patient will be independent with a basic HEP.  2. This patient will increase L shoulder AROM by 25* in order to dress with less difficulty.  3. This patient will increase L UE strength by 1 grade in order to reach a shelf at shoulder height.   4. This patient will have a pain rating of 5/10 at worst with ADLs.  5. Patient will be able to achieve greater than or equal to 60 on the FOTO Shoulder Survey placing patient in 40%<60% impaired, limited, or restricted category demonstrating overall improved functional ability with  upper extremity.     Long Term Goals (8 Weeks):   1. This patient will be independent with an updated HEP.  2. This patient will have L shoulder AROM equal to R shoulder AROM in order to perform overhead activities.  3. This patient will increase L UE strength to 4/5 in order to perform his usual ADLs with no difficulty.   4. This patient will have a pain rating of 3/10 at worst with ADLs.  5. Patient will be able to achieve greater than or equal to 75 on the FOTO Shoulder Survey placing patient in 20%<40% impaired, limited, or restricted category demonstrating overall improved functional ability with upper extremity.

## 2017-10-11 ENCOUNTER — CLINICAL SUPPORT (OUTPATIENT)
Dept: REHABILITATION | Facility: HOSPITAL | Age: 52
End: 2017-10-11
Attending: INTERNAL MEDICINE
Payer: MEDICARE

## 2017-10-11 DIAGNOSIS — M25.612 DECREASED RANGE OF MOTION OF LEFT SHOULDER: ICD-10-CM

## 2017-10-11 DIAGNOSIS — R52 PAIN AGGRAVATED BY ACTIVITIES OF DAILY LIVING: ICD-10-CM

## 2017-10-11 DIAGNOSIS — R29.898 WEAKNESS OF LEFT UPPER EXTREMITY: ICD-10-CM

## 2017-10-11 PROCEDURE — 97110 THERAPEUTIC EXERCISES: CPT

## 2017-10-11 NOTE — PROGRESS NOTES
TIME RECORD    Date:  10/11/2017    Start Time:  8:00  Stop Time:  8:35    PROCEDURES:    TIMED  Procedure Min.       TE  29   Pulleys sup 6 NC             UNTIMED  Procedure Min.             Total Timed Minutes:  29  Total Timed Units:  2  Total Untimed Units:  0  Charges Billed/# of units:  2      Progress/Current Status    Subjective:     Patient ID: Juan Carlos Hammer is a 52 y.o. male.  Diagnosis:   1. Decreased range of motion of left shoulder     2. Weakness of left upper extremity     3. Pain aggravated by activities of daily living       Pain: 6 /10  Patient reports doing ok this morning, his pain is 6/10.    Objective:     Patient initiated the session on pulleys x 6 minutes supervised by PT. Patient was educated and performed therapeutic exercises as per log below, along with today's new exercises 1:1 with PT x 29 minutes to improve his strength, ROM, and flexibility. He declined a cold pack after the session.     Date  10/11/17 10/09/17   VISIT 12/23    G CODE VISIT 3/10 2/10   POC EXP. DATE 12/05/17 12/05/17   VISIT AMOUNT    MEDICARE TOTAL 63.96    971.04     907.08   FACE-TO-FACE 11/05/17 11/05/17        Pulleys - flex Flex & abd 3' ea 3'   TABLE:     Wand   - flex  - abd  -ER   1 x 10  1 x 10  1 x 10   1 x 10  --   Scapula protraction 1 x 10         STANDING:     Walls walks - flex 1 x 10 1 x 10   Scapula squeeze 1 x 10    Rows 1 x 10 RTB                             CP declined declined        Initials DG GWA 1/6       Assessment:     Patient was able to move through full flexion and abduction on pulleys and with wand with no difficulty but did occasionally grimace or moan when he noticed the therapist watching him. He was able to perform all of today's activities with no difficulty. He required frequent cues for correct technique with all exercises and scapula position with standing exercises.     Patient Education/Response:     Patient was given handouts of today's exercises for his HEP and instructed to  continue with HEP daily.  Patient verbalized understanding instructions.    Plans and Goals:     Continue with plan of care and progress toward PT goals as patient tolerates.    Short Term Goals (4 Weeks):   1. This patient will be independent with a basic HEP.  2. This patient will increase L shoulder AROM by 25* in order to dress with less difficulty.  3. This patient will increase L UE strength by 1 grade in order to reach a shelf at shoulder height.   4. This patient will have a pain rating of 5/10 at worst with ADLs.  5. Patient will be able to achieve greater than or equal to 60 on the FOTO Shoulder Survey placing patient in 40%<60% impaired, limited, or restricted category demonstrating overall improved functional ability with upper extremity.     Long Term Goals (8 Weeks):   1. This patient will be independent with an updated HEP.  2. This patient will have L shoulder AROM equal to R shoulder AROM in order to perform overhead activities.  3. This patient will increase L UE strength to 4/5 in order to perform his usual ADLs with no difficulty.   4. This patient will have a pain rating of 3/10 at worst with ADLs.  5. Patient will be able to achieve greater than or equal to 75 on the FOTO Shoulder Survey placing patient in 20%<40% impaired, limited, or restricted category demonstrating overall improved functional ability with upper extremity.

## 2017-10-16 ENCOUNTER — CLINICAL SUPPORT (OUTPATIENT)
Dept: REHABILITATION | Facility: HOSPITAL | Age: 52
End: 2017-10-16
Attending: INTERNAL MEDICINE
Payer: MEDICARE

## 2017-10-16 DIAGNOSIS — R52 PAIN AGGRAVATED BY ACTIVITIES OF DAILY LIVING: ICD-10-CM

## 2017-10-16 DIAGNOSIS — R29.898 WEAKNESS OF LEFT UPPER EXTREMITY: ICD-10-CM

## 2017-10-16 DIAGNOSIS — M25.612 DECREASED RANGE OF MOTION OF LEFT SHOULDER: ICD-10-CM

## 2017-10-16 PROCEDURE — 97110 THERAPEUTIC EXERCISES: CPT

## 2017-10-16 NOTE — PROGRESS NOTES
TIME RECORD    Date:  10/16/2017    Start Time:  8:00  Stop Time:  8:35    PROCEDURES:    TIMED  Procedure Min.   TE sup 4NC   TE  25   Pulleys sup 6 NC             UNTIMED  Procedure Min.             Total Timed Minutes:  25  Total Timed Units:  2  Total Untimed Units:  0  Charges Billed/# of units:  2      Progress/Current Status    Subjective:     Patient ID: Juan Carlos Hammer is a 52 y.o. male.  Diagnosis:   1. Decreased range of motion of left shoulder     2. Weakness of left upper extremity     3. Pain aggravated by activities of daily living       Pain: 6 /10  He reports he thinks his shoulder is getting a little better.     Objective:     Patient initiated the session on pulleys x 6 minutes supervised by PT. Patient was educated and performed therapeutic exercises as per log below, along with today's new exercises and progressions 1:1 with PT x 25 minutes and supervised by PT x 4 minutes to improve his strength, ROM, and flexibility. He declined a cold pack after the session.     Date  10/16/17 10/11/17 10/09/17   VISIT 13/23 12/23    G CODE VISIT 4/10 3/10 2/10   POC EXP. DATE 12/05/17 12/05/17 12/05/17   VISIT AMOUNT    MEDICARE TOTAL 63.96    1035.00 63.96    971.04     907.08   FACE-TO-FACE 11/05/17 11/05/17 11/05/17         Pulleys - flex 6' Flex & abd 3' ea 3'   TABLE:      Wand   - flex  - abd  -ER   1 x 20  1 x 20  1 x 20   1 x 10  1 x 10  1 x 10   1 x 10  --   Scapula protraction 1 x 15 1 x 10    Prone  Flex  Abd  Scap  Rows   1 x 10  1 x 10  --  --     STANDING:      Walls walks - flex 1 x 10 1 x 10 1 x 10   Scapula squeeze 2 x 10 1 x 10    Rows  T's  W's  ER  I's 2 x 10 RTB  1 x 10 RTB  --  --  -- 1 x 10 RTB    scaption 1 x 10                             CP declined declined declined         Initials DG  GWA 1/6       Assessment:     He continues to require frequent cues to perform all of his exercises correctly and for scapula positioning with prone and standing exercises. He performed all of today's  progressions and new exercises with no increase in symptoms prior to leaving the clinic.     Patient Education/Response:     Patient was given handouts of today's exercises for his HEP and instructed to continue with HEP daily.  Patient verbalized understanding instructions.    Plans and Goals:     Continue with plan of care and progress toward PT goals as patient tolerates.    Short Term Goals (4 Weeks):   1. This patient will be independent with a basic HEP.  2. This patient will increase L shoulder AROM by 25* in order to dress with less difficulty.  3. This patient will increase L UE strength by 1 grade in order to reach a shelf at shoulder height.   4. This patient will have a pain rating of 5/10 at worst with ADLs.  5. Patient will be able to achieve greater than or equal to 60 on the FOTO Shoulder Survey placing patient in 40%<60% impaired, limited, or restricted category demonstrating overall improved functional ability with upper extremity.     Long Term Goals (8 Weeks):   1. This patient will be independent with an updated HEP.  2. This patient will have L shoulder AROM equal to R shoulder AROM in order to perform overhead activities.  3. This patient will increase L UE strength to 4/5 in order to perform his usual ADLs with no difficulty.   4. This patient will have a pain rating of 3/10 at worst with ADLs.  5. Patient will be able to achieve greater than or equal to 75 on the FOTO Shoulder Survey placing patient in 20%<40% impaired, limited, or restricted category demonstrating overall improved functional ability with upper extremity.

## 2017-10-18 ENCOUNTER — CLINICAL SUPPORT (OUTPATIENT)
Dept: REHABILITATION | Facility: HOSPITAL | Age: 52
End: 2017-10-18
Attending: INTERNAL MEDICINE
Payer: MEDICARE

## 2017-10-18 DIAGNOSIS — M25.612 DECREASED RANGE OF MOTION OF LEFT SHOULDER: ICD-10-CM

## 2017-10-18 DIAGNOSIS — R29.898 WEAKNESS OF LEFT UPPER EXTREMITY: ICD-10-CM

## 2017-10-18 DIAGNOSIS — R52 PAIN AGGRAVATED BY ACTIVITIES OF DAILY LIVING: ICD-10-CM

## 2017-10-18 PROCEDURE — 97110 THERAPEUTIC EXERCISES: CPT

## 2017-10-18 NOTE — PROGRESS NOTES
"TIME RECORD    Date:  10/18/2017    Start Time:  8:00  Stop Time:  8:40    PROCEDURES:    TIMED  Procedure Min.       TE  34   Pulleys sup 6 NC             UNTIMED  Procedure Min.             Total Timed Minutes:  34  Total Timed Units:  2  Total Untimed Units:  0  Charges Billed/# of units:  2      Progress/Current Status    Subjective:     Patient ID: Juan Carlos Hammer is a 52 y.o. male.  Diagnosis:   1. Decreased range of motion of left shoulder     2. Weakness of left upper extremity     3. Pain aggravated by activities of daily living       Pain: 7 /10  Patient reports his pain is getting worse and he feels like he tore something in his shoulder. He just woke up that way the other morning, he denies any new activities prior to this feeling.     Objective:     Patient initiated the session on pulleys x 6 minutes supervised by PT. Patient was educated and performed therapeutic exercises as per log below, along with today's new exercises and progressions 1:1 with PT x 34 minutes to improve his strength, ROM, and flexibility. He declined a cold pack after the session, stating he would just "tough it out".     Date  10/18/17 10/16/17 10/11/17 10/09/17   VISIT 14/23 13/23 12/23    G CODE VISIT 5/10 4/10 3/10 2/10   POC EXP. DATE 12/05/17 12/05/17 12/05/17 12/05/17   VISIT AMOUNT    MEDICARE TOTAL  63.96    1035.00 63.96    971.04     907.08   FACE-TO-FACE 11/05/17 11/05/17 11/05/17 11/05/17   FOTO 5/5             Pulleys - flex 6' 6' Flex & abd 3' ea 3'   TABLE:       Wand   - flex  - abd  -ER   1 x 20  1 x 20  1 x 20   1 x 20  1 x 20  1 x 20   1 x 10  1 x 10  1 x 10   1 x 10  --   Scapula protraction 1 x 20 1 x 15 1 x 10    Prone  Flex  Abd  Scap  Rows   1 x 15  1 x 15  --  1 x 10   1 x 10  1 x 10  --  --     Side lying ER 1 x 10      STANDING:       Walls walks - flex 1 x 10 1 x 10 1 x 10 1 x 10   Scapula squeeze 2 x 10 2 x 10 1 x 10    Rows  T's  W's  ER  I's 3 x 10 RTB  1 x 15 RTB  1 x 10 RTB  --  -- 2 x 10 RTB  1 x 10 " RTB  --  --  -- 1 x 10 RTB    scaption 1 x 10 1 x 10     Wall push ups 1 x 10                           CP declined declined declined declined          Initials DG DG DG GWA 1/6     FOTO Shoulder Survey 48, G code CK, 40%<60%  Assessment:     Patient was able to perform pulley exercises through ROM WNL with no complaints of pain or discomfort. He continues to require frequent cues for scapula placement with prone exercises and for correct technique with all exercises. When initiating standing scaption exercises he had to throw his arm up and let it drop down due to complaints of pain, but when PT repositioning his hands to the plane of scaption he was able to perform the exercise with no difficulty. He performed all of today's progressions and new exercises with no increase in symptoms prior to leaving the clinic. His current score on FOTO Shoulder Survey places him in the 40%<60% impaired, limited, or restricted category.     Patient Education/Response:     Patient was instructed to continue with HEP daily.  Patient verbalized understanding instructions.    Plans and Goals:     Continue with plan of care and progress toward PT goals as patient tolerates.    Short Term Goals (4 Weeks):   1. This patient will be independent with a basic HEP.  2. This patient will increase L shoulder AROM by 25* in order to dress with less difficulty.  3. This patient will increase L UE strength by 1 grade in order to reach a shelf at shoulder height.   4. This patient will have a pain rating of 5/10 at worst with ADLs.  5. Patient will be able to achieve greater than or equal to 60 on the FOTO Shoulder Survey placing patient in 40%<60% impaired, limited, or restricted category demonstrating overall improved functional ability with upper extremity.     Long Term Goals (8 Weeks):   1. This patient will be independent with an updated HEP.  2. This patient will have L shoulder AROM equal to R shoulder AROM in order to perform overhead  activities.  3. This patient will increase L UE strength to 4/5 in order to perform his usual ADLs with no difficulty.   4. This patient will have a pain rating of 3/10 at worst with ADLs.  5. Patient will be able to achieve greater than or equal to 75 on the FOTO Shoulder Survey placing patient in 20%<40% impaired, limited, or restricted category demonstrating overall improved functional ability with upper extremity.

## 2017-10-23 ENCOUNTER — CLINICAL SUPPORT (OUTPATIENT)
Dept: REHABILITATION | Facility: HOSPITAL | Age: 52
End: 2017-10-23
Attending: INTERNAL MEDICINE
Payer: MEDICARE

## 2017-10-23 DIAGNOSIS — M25.612 DECREASED RANGE OF MOTION OF LEFT SHOULDER: ICD-10-CM

## 2017-10-23 DIAGNOSIS — R52 PAIN AGGRAVATED BY ACTIVITIES OF DAILY LIVING: ICD-10-CM

## 2017-10-23 DIAGNOSIS — R29.898 WEAKNESS OF LEFT UPPER EXTREMITY: ICD-10-CM

## 2017-10-23 PROCEDURE — 97110 THERAPEUTIC EXERCISES: CPT

## 2017-10-23 NOTE — PROGRESS NOTES
"TIME RECORD    Date:  10/23/2017    Start Time:  8:00  Stop Time:  8:50    PROCEDURES:    TIMED  Procedure Min.   te SUP 19NC   TE  25   Pulleys sup 6 NC             UNTIMED  Procedure Min.             Total Timed Minutes:  25  Total Timed Units:  2  Total Untimed Units:  0  Charges Billed/# of units:  2      Progress/Current Status    Subjective:     Patient ID: Juan Carlos Hammer is a 52 y.o. male.  Diagnosis:   1. Decreased range of motion of left shoulder     2. Weakness of left upper extremity     3. Pain aggravated by activities of daily living       Pain: 6 /10  He reports having some soreness in his shoulder this morning but it is getting better.    Objective:     Patient initiated the session on pulleys x 6 minutes supervised by PT. Patient was educated and performed therapeutic exercises as per log below, along with today's new exercises and progressions supervised by PT x 19 minutes and 1:1 with PT x 25 minutes to improve his strength, ROM, and flexibility. He declined a cold pack after the session, stating he would just "tough it out".     Date  10/23/17 10/18/17 10/16/17 10/11/17 10/09/17   VISIT 15/23 14/23 13/23 12/23    G CODE VISIT 6/10 5/10 4/10 3/10 2/10   POC EXP. DATE 12/05/17 12/05/17 12/05/17 12/05/17 12/05/17   VISIT AMOUNT    MEDICARE TOTAL 63.96    1162.92 63.96    1098.96 63.96    1035.00 63.96    971.04     907.08   FACE-TO-FACE 11/05/17 11/05/17 11/05/17 11/05/17 11/05/17   FOTO -- 5/5              Pulleys - flex 6' 6' 6' Flex & abd 3' ea 3'   TABLE:        Wand   - flex  - abd  -ER   1 x 20  1 x 20  1 x 20   1 x 20  1 x 20  1 x 20   1 x 20  1 x 20  1 x 20   1 x 10  1 x 10  1 x 10   1 x 10  --   Scapula protraction 1 x 30 1 x 20 1 x 15 1 x 10    Prone  Flex  Abd  Scap  Rows   2 x 10  2 x 10  1 x 10  2 x 10   1 x 15  1 x 15  --  1 x 10   1 x 10  1 x 10  --  --     Side lying ER 1 x 15 1 x 10      Side lying abd 1 x 10       STANDING:        Walls walks - flex 1 x 10 1 x 10 1 x 10 1 x 10 1 x 10 "   Scapula squeeze 2 x 10 2 x 10 2 x 10 1 x 10    Rows  T's  W's  ER  I's 3 x 10 RTB  2 x 10 RTB  2 x 10 RTB  1 x 10 RTB  2 x 10 RTB 3 x 10 RTB  1 x 15 RTB  1 x 10 RTB  --  -- 2 x 10 RTB  1 x 10 RTB  --  --  -- 1 x 10 RTB    scaption 1 x 10 1 x 10 1 x 10     Wall push ups 1 x 15 1 x 10                              CP declined declined declined declined declined           Initials DG DG DG DG GWA 1/6       Assessment:     Patient performed pulley exercises with no complaints of pain or discomfort. He continues to require frequent cues to perform all of his exercises correctly and for the number of reps prescribed. He required frequent cues with prone and standing resistance exercises for scapula position. He performed all of today's activities with no increase in symptoms prior to leaving the clinic.     Patient Education/Response:     Patient was instructed to continue with HEP daily.  Patient verbalized understanding instructions.    Plans and Goals:     Continue with plan of care and progress toward PT goals as patient tolerates.    Short Term Goals (4 Weeks):   1. This patient will be independent with a basic HEP.  2. This patient will increase L shoulder AROM by 25* in order to dress with less difficulty.  3. This patient will increase L UE strength by 1 grade in order to reach a shelf at shoulder height.   4. This patient will have a pain rating of 5/10 at worst with ADLs.  5. Patient will be able to achieve greater than or equal to 60 on the FOTO Shoulder Survey placing patient in 40%<60% impaired, limited, or restricted category demonstrating overall improved functional ability with upper extremity.     Long Term Goals (8 Weeks):   1. This patient will be independent with an updated HEP.  2. This patient will have L shoulder AROM equal to R shoulder AROM in order to perform overhead activities.  3. This patient will increase L UE strength to 4/5 in order to perform his usual ADLs with no difficulty.   4. This  patient will have a pain rating of 3/10 at worst with ADLs.  5. Patient will be able to achieve greater than or equal to 75 on the FOTO Shoulder Survey placing patient in 20%<40% impaired, limited, or restricted category demonstrating overall improved functional ability with upper extremity.

## 2017-10-25 ENCOUNTER — CLINICAL SUPPORT (OUTPATIENT)
Dept: REHABILITATION | Facility: HOSPITAL | Age: 52
End: 2017-10-25
Attending: INTERNAL MEDICINE
Payer: MEDICARE

## 2017-10-25 DIAGNOSIS — R52 PAIN AGGRAVATED BY ACTIVITIES OF DAILY LIVING: ICD-10-CM

## 2017-10-25 DIAGNOSIS — R29.898 WEAKNESS OF LEFT UPPER EXTREMITY: ICD-10-CM

## 2017-10-25 DIAGNOSIS — M25.612 DECREASED RANGE OF MOTION OF LEFT SHOULDER: ICD-10-CM

## 2017-10-25 PROCEDURE — 97110 THERAPEUTIC EXERCISES: CPT

## 2017-10-25 NOTE — PROGRESS NOTES
TIME RECORD    Date:  10/25/2017    Start Time:  8:00  Stop Time:  8:55    PROCEDURES:    TIMED  Procedure Min.   TE 25   TE sup 24 NC   Pulleys sup 6 NC             UNTIMED  Procedure Min.             Total Timed Minutes:  25  Total Timed Units:  2  Total Untimed Units:  0  Charges Billed/# of units:  2 (TE-2)      Progress/Current Status    Subjective:     Patient ID: Juan Carlos Hammer is a 52 y.o. male.  Diagnosis:   1. Decreased range of motion of left shoulder     2. Weakness of left upper extremity     3. Pain aggravated by activities of daily living       Pain: 6 /10  Patient reports he continues with pain and tightness in his shoulder which increases with movement.    Objective:     Patient initiated the session on pulleys x 6 minutes supervised by PTA.  Patient was educated and performed therapeutic exercises as per log below, along with today's progressions, 1:1 with PTA x 25 minutes and supervised exercises by PTA x 24 minutes to improve his strength, ROM, and flexibility. He declined a cold pack after the session.     Date  10/25/17 10/23/17 10/18/17 10/16/17 10/11/17 10/09/17   VISIT 16/23 15/23 14/23 13/23 12/23    G CODE VISIT 7/10 6/10 5/10 4/10 3/10 2/10   POC EXP. DATE 12/05/17 12/05/17 12/05/17 12/05/17 12/05/17 12/05/17   VISIT AMOUNT    MEDICARE TOTAL 63.96    1226.88 63.96    1162.92 63.96    1098.96 63.96    1035.00 63.96    971.04     907.08   FACE-TO-FACE 11/05/17 11/05/17 11/05/17 11/05/17 11/05/17 11/05/17   FOTO -- -- 5/5               Pulleys - flex 6' 6' 6' 6' Flex & abd 3' ea 3'   TABLE:         Wand   - flex  - abd  -ER   1 x 20  1 x 20  1 x 20   1 x 20  1 x 20  1 x 20   1 x 20  1 x 20  1 x 20   1 x 20  1 x 20  1 x 20   1 x 10  1 x 10  1 x 10   1 x 10  --   Scapula protraction 1 x 30 1 x 30 1 x 20 1 x 15 1 x 10    Prone  Flex  Abd  Scap  Rows   2 x 10  2 x 10  1 x 10  2 x 10    2 x 10  2 x 10  1 x 10  2 x 10   1 x 15  1 x 15  --  1 x 10   1 x 10  1 x 10  --  --     Side lying ER 1 x 15 1 x 15  1 x 10      Side lying abd 1 x 15 1 x 10       STANDING:         Walls walks - flex 1 x 10 1 x 10 1 x 10 1 x 10 1 x 10 1 x 10   Scapula squeeze 2 x 10 2 x 10 2 x 10 2 x 10 1 x 10    Rows  T's  W's  ER  I's 2 x 10 GTB  2 x 10 RTB  2 x 10 RTB  2 x 10 RTB  2 x 10 RTB   3 x 10 RTB  2 x 10 RTB  2 x 10 RTB  1 x 10 RTB  2 x 10 RTB 3 x 10 RTB  1 x 15 RTB  1 x 10 RTB  --  -- 2 x 10 RTB  1 x 10 RTB  --  --  -- 1 x 10 RTB    scaption 1 x 10 1 x 10 1 x 10 1 x 10     Wall push ups 1 x 15 1 x 15 1 x 10               CP declined declined declined declined declined declined            Initials GWA 1/6 DG DG DG DG GWA 1/6       Assessment:     Patient performed pulley exercises with no complaints of pain or discomfort. He continues to require frequent cues to perform all of his exercises correctly and for the number of reps prescribed. He required frequent cues with prone and standing resistance exercises for scapula position. Patient completed his therapy along with today's progressions with no increase in symptoms prior to leaving the clinic.     Patient Education/Response:     Patient was instructed to continue with HEP daily.  Patient verbalized understanding instructions.    Plans and Goals:     Continue with plan of care and progress toward PT goals as patient tolerates.    Short Term Goals (4 Weeks):   1. This patient will be independent with a basic HEP.  2. This patient will increase L shoulder AROM by 25* in order to dress with less difficulty.  3. This patient will increase L UE strength by 1 grade in order to reach a shelf at shoulder height.   4. This patient will have a pain rating of 5/10 at worst with ADLs.  5. Patient will be able to achieve greater than or equal to 60 on the FOTO Shoulder Survey placing patient in 40%<60% impaired, limited, or restricted category demonstrating overall improved functional ability with upper extremity.     Long Term Goals (8 Weeks):   1. This patient will be independent with an  updated HEP.  2. This patient will have L shoulder AROM equal to R shoulder AROM in order to perform overhead activities.  3. This patient will increase L UE strength to 4/5 in order to perform his usual ADLs with no difficulty.   4. This patient will have a pain rating of 3/10 at worst with ADLs.  5. Patient will be able to achieve greater than or equal to 75 on the FOTO Shoulder Survey placing patient in 20%<40% impaired, limited, or restricted category demonstrating overall improved functional ability with upper extremity.

## 2017-11-06 ENCOUNTER — CLINICAL SUPPORT (OUTPATIENT)
Dept: REHABILITATION | Facility: HOSPITAL | Age: 52
End: 2017-11-06
Attending: INTERNAL MEDICINE
Payer: MEDICARE

## 2017-11-06 ENCOUNTER — DOCUMENTATION ONLY (OUTPATIENT)
Dept: REHABILITATION | Facility: HOSPITAL | Age: 52
End: 2017-11-06

## 2017-11-06 DIAGNOSIS — R52 PAIN AGGRAVATED BY ACTIVITIES OF DAILY LIVING: ICD-10-CM

## 2017-11-06 DIAGNOSIS — M25.612 DECREASED RANGE OF MOTION OF LEFT SHOULDER: ICD-10-CM

## 2017-11-06 DIAGNOSIS — R29.898 WEAKNESS OF LEFT UPPER EXTREMITY: ICD-10-CM

## 2017-11-06 PROCEDURE — 97110 THERAPEUTIC EXERCISES: CPT

## 2017-11-06 NOTE — PROGRESS NOTES
TIME RECORD    Date:  11/06/2017    Start Time:  8:04  Stop Time:  ***    PROCEDURES:    TIMED  Procedure Min.   Pulley's sup 6 NC   TE ***                 UNTIMED  Procedure Min.             Total Timed Minutes:  ***  Total Timed Units:  ***  Total Untimed Units:  ***  Charges Billed/# of units:  ***      Progress/Current Status    Subjective:     Patient ID: Juan Carlos Hammer is a 52 y.o. male.  Diagnosis:   1. Decreased range of motion of left shoulder     2. Weakness of left upper extremity     3. Pain aggravated by activities of daily living       Pain: 7 /10  Patient reports his left shoulder feels the same, hurts all of the time.    Objective:     ***    Patient initiated the session on pulleys x 6 minutes supervised by PTA.  Patient was educated and performed therapeutic exercises as per log below, along with today's progressions, 1:1 with PTA x *** minutes and supervised exercises by PTA x *** minutes to improve his strength, ROM, and flexibility. He declined a cold pack after the session.     Date  11/06/17 10/25/17 10/23/17 10/18/17 10/16/17 10/11/17 10/09/17   VISIT 17/23 16/23 15/23 14/23 13/23 12/23    G CODE VISIT 8/10 7/10 6/10 5/10 4/10 3/10 2/10   POC EXP. DATE 12/05/17 12/05/17 12/05/17 12/05/17 12/05/17 12/05/17 12/05/17   VISIT AMOUNT    MEDICARE TOTAL ***     63.96    1226.88 63.96    1162.92 63.96    1098.96 63.96    1035.00 63.96    971.04     907.08   FACE-TO-FACE 12/06/17 11/05/17 11/05/17 11/05/17 11/05/17 11/05/17 11/05/17   FOTO  -- -- 5/5                Pulleys - flex 6' 6' 6' 6' 6' Flex & abd 3' ea 3'   TABLE:          Wand   - flex  - abd  -ER   1 x 20  1 x 20  1 x 20    1 x 20  1 x 20  1 x 20   1 x 20  1 x 20  1 x 20   1 x 20  1 x 20  1 x 20   1 x 20  1 x 20  1 x 20   1 x 10  1 x 10  1 x 10   1 x 10  --   Scapula protraction 1 x 30 1 x 30 1 x 30 1 x 20 1 x 15 1 x 10    Prone  Flex  Abd  Scap  Rows   2 x 10  2 x 10  2 x 10  2 x 10    2 x 10  2 x 10  1 x 10  2 x 10    2 x 10  2 x 10  1 x 10  2  x 10   1 x 15  1 x 15  --  1 x 10   1 x 10  1 x 10  --  --     Side lying ER  1 x 15 1 x 15 1 x 10      Side lying abd  1 x 15 1 x 10       STANDING:          Walls walks - flex  1 x 10 1 x 10 1 x 10 1 x 10 1 x 10 1 x 10   Scapula squeeze  2 x 10 2 x 10 2 x 10 2 x 10 1 x 10    Rows  T's  W's  ER  I's  2 x 10 GTB  2 x 10 RTB  2 x 10 RTB  2 x 10 RTB  2 x 10 RTB   3 x 10 RTB  2 x 10 RTB  2 x 10 RTB  1 x 10 RTB  2 x 10 RTB 3 x 10 RTB  1 x 15 RTB  1 x 10 RTB  --  -- 2 x 10 RTB  1 x 10 RTB  --  --  -- 1 x 10 RTB    scaption  1 x 10 1 x 10 1 x 10 1 x 10     Wall push ups  1 x 15 1 x 15 1 x 10                CP declined declined declined declined declined declined declined             Initials GWA 2/6 GWA 1/6 DG DG DG DG GWA 1/6       Assessment:     Patient performed pulley exercises with no complaints of pain or discomfort. He continues to require frequent cues to perform all of his exercises correctly and for the number of reps prescribed. He required frequent cues with prone and standing resistance exercises for scapula position. Patient completed his therapy along with today's progressions with no increase in symptoms prior to leaving the clinic.     Patient Education/Response:     Patient was instructed to continue with HEP daily.  Patient verbalized understanding instructions.    Plans and Goals:     Continue with plan of care and progress toward PT goals as patient tolerates.    Short Term Goals (4 Weeks):   1. This patient will be independent with a basic HEP.  2. This patient will increase L shoulder AROM by 25* in order to dress with less difficulty.  3. This patient will increase L UE strength by 1 grade in order to reach a shelf at shoulder height.   4. This patient will have a pain rating of 5/10 at worst with ADLs.  5. Patient will be able to achieve greater than or equal to 60 on the FOTO Shoulder Survey placing patient in 40%<60% impaired, limited, or restricted category demonstrating overall improved  functional ability with upper extremity.     Long Term Goals (8 Weeks):   1. This patient will be independent with an updated HEP.  2. This patient will have L shoulder AROM equal to R shoulder AROM in order to perform overhead activities.  3. This patient will increase L UE strength to 4/5 in order to perform his usual ADLs with no difficulty.   4. This patient will have a pain rating of 3/10 at worst with ADLs.  5. Patient will be able to achieve greater than or equal to 75 on the FOTO Shoulder Survey placing patient in 20%<40% impaired, limited, or restricted category demonstrating overall improved functional ability with upper extremity.

## 2017-11-06 NOTE — PROGRESS NOTES
TIME RECORD    Date:  11/06/2017    Start Time:  8:00  Stop Time:  8:45    PROCEDURES:    TIMED  Procedure Min.   TE 39       Pulleys sup 6 NC             UNTIMED  Procedure Min.             Total Timed Minutes:  39  Total Timed Units:  3  Total Untimed Units:  0  Charges Billed/# of units:  3 (TE-3)      Progress/Current Status    Subjective:     Patient ID: Juan Carlos Hammer is a 52 y.o. male.  Diagnosis:   1. Decreased range of motion of left shoulder     2. Weakness of left upper extremity     3. Pain aggravated by activities of daily living       Pain: 6 /10  He continues to report pain with ADLs and his pain increased to a 7/10 after 2 reps of pulleys, but he notices it is getting better.    Objective:     Patient initiated the session on pulleys x 6 minutes supervised by PT.  Patient was educated and performed therapeutic exercises as per log below, along with today's progressions, 1:1 with PT x 39 minutes to improve his strength, ROM, and flexibility. He declined a cold pack after the session.     Date  11/06/17 10/25/17 10/23/17 10/18/17 10/16/17 10/11/17 10/09/17   VISIT 17/23 16/23 15/23 14/23 13/23 12/23    G CODE VISIT 8/10 7/10 6/10 5/10 4/10 3/10 2/10   POC EXP. DATE 12/05/17 12/05/17 12/05/17 12/05/17 12/05/17 12/05/17 12/05/17   VISIT AMOUNT    MEDICARE TOTAL 95.94    1322.82 63.96    1226.88 63.96    1162.92 63.96    1098.96 63.96    1035.00 63.96    971.04     907.08   FACE-TO-FACE 12/06/17 11/05/17 11/05/17 11/05/17 11/05/17 11/05/17 11/05/17   FOTO -- -- -- 5/5                Pulleys - flex 6' 6' 6' 6' 6' Flex & abd 3' ea 3'   TABLE:          Wand   - flex  - abd  -ER   1 x 20  1 x 20  1 x 20   1 x 20  1 x 20  1 x 20   1 x 20  1 x 20  1 x 20   1 x 20  1 x 20  1 x 20   1 x 20  1 x 20  1 x 20   1 x 10  1 x 10  1 x 10   1 x 10  --   Scapula protraction 1 x 30 1 x 30 1 x 30 1 x 20 1 x 15 1 x 10    Prone  Flex  Abd  Scap  Rows   2 x 10  2 x 10  2 x 10  2 x 10   2 x 10  2 x 10  1 x 10  2 x 10    2 x 10  2 x  10  1 x 10  2 x 10   1 x 15  1 x 15  --  1 x 10   1 x 10  1 x 10  --  --     Side lying ER 2 x 10 1 x 15 1 x 15 1 x 10      Side lying abd 2 x 10 1 x 15 1 x 10       STANDING:          Walls walks - flex 1 x 10 1 x 10 1 x 10 1 x 10 1 x 10 1 x 10 1 x 10   Scapula squeeze 2 x 10 2 x 10 2 x 10 2 x 10 2 x 10 1 x 10    Rows  T's  W's  ER  I's 3 x 10 GTB  3 x 10 RTB  3 x 10 RTB  3 x 10 RTB  3 x 10 RTB 2 x 10 GTB  2 x 10 RTB  2 x 10 RTB  2 x 10 RTB  2 x 10 RTB   3 x 10 RTB  2 x 10 RTB  2 x 10 RTB  1 x 10 RTB  2 x 10 RTB 3 x 10 RTB  1 x 15 RTB  1 x 10 RTB  --  -- 2 x 10 RTB  1 x 10 RTB  --  --  -- 1 x 10 RTB    scaption 1 x 10 1 x 10 1 x 10 1 x 10 1 x 10     Wall push ups 2 x 10 1 x 15 1 x 15 1 x 10                CP declined declined declined declined declined declined declined             Initials DG GWA 1/6 DG DG DG DG GWA 1/6       Assessment:     He continues to require cues to perform his exercises correctly and to avoid using momentum with his prone exercises. He demonstrates increased strength in his UEs as he was able to move the exercise plinth to the left several inches after performing his mat exercises with no complaints of pain. He was able to perform all of today's progressions with no increase in symptoms prior to leaving the clinic as he rated his pain as 7/10.    Patient Education/Response:     Patient was instructed to continue with HEP daily.  Patient verbalized understanding instructions.    Plans and Goals:     Continue with plan of care and progress toward PT goals as patient tolerates.    Short Term Goals (4 Weeks):   1. This patient will be independent with a basic HEP.  2. This patient will increase L shoulder AROM by 25* in order to dress with less difficulty.  3. This patient will increase L UE strength by 1 grade in order to reach a shelf at shoulder height.   4. This patient will have a pain rating of 5/10 at worst with ADLs.  5. Patient will be able to achieve greater than or equal to 60 on  the FOTO Shoulder Survey placing patient in 40%<60% impaired, limited, or restricted category demonstrating overall improved functional ability with upper extremity.     Long Term Goals (8 Weeks):   1. This patient will be independent with an updated HEP.  2. This patient will have L shoulder AROM equal to R shoulder AROM in order to perform overhead activities.  3. This patient will increase L UE strength to 4/5 in order to perform his usual ADLs with no difficulty.   4. This patient will have a pain rating of 3/10 at worst with ADLs.  5. Patient will be able to achieve greater than or equal to 75 on the FOTO Shoulder Survey placing patient in 20%<40% impaired, limited, or restricted category demonstrating overall improved functional ability with upper extremity.

## 2017-11-06 NOTE — PROGRESS NOTES
Face to Face PTA Conference performed with Marcelo Herr, PTA regarding patient's current status, overall progress, and plan of care    Face to Face PTA Conference performed with Chuyita Ledezma, PT regarding patient's current status, overall progress, and plan of care    Marcelo Herr  11/6/2017

## 2017-11-15 ENCOUNTER — CLINICAL SUPPORT (OUTPATIENT)
Dept: REHABILITATION | Facility: HOSPITAL | Age: 52
End: 2017-11-15
Attending: INTERNAL MEDICINE
Payer: MEDICARE

## 2017-11-15 DIAGNOSIS — R29.898 WEAKNESS OF LEFT UPPER EXTREMITY: ICD-10-CM

## 2017-11-15 DIAGNOSIS — M25.612 DECREASED RANGE OF MOTION OF LEFT SHOULDER: ICD-10-CM

## 2017-11-15 DIAGNOSIS — R52 PAIN AGGRAVATED BY ACTIVITIES OF DAILY LIVING: ICD-10-CM

## 2017-11-15 PROCEDURE — 97110 THERAPEUTIC EXERCISES: CPT

## 2017-11-15 NOTE — PROGRESS NOTES
TIME RECORD    Date:  11/15/2017    Start Time:  8:00  Stop Time:  8:40    PROCEDURES:    TIMED  Procedure Min.   TE 34       Pulleys sup 6 NC             UNTIMED  Procedure Min.             Total Timed Minutes:  34  Total Timed Units:  2  Total Untimed Units:  0  Charges Billed/# of units:  2 (TE-2)      Progress/Current Status    Subjective:     Patient ID: Juan Carlos Hammer is a 52 y.o. male.  Diagnosis:   1. Decreased range of motion of left shoulder     2. Weakness of left upper extremity     3. Pain aggravated by activities of daily living       Pain: 6 /10  He reports he shoulder is getting better as he feels it is a little bit stronger. He has been doing pretty good with his HEP and has no difficulty with any of the exercises.     Objective:     Patient initiated the session on pulleys x 6 minutes supervised by PT.  Patient was educated and performed therapeutic exercises as per log below, along with today's progressions and new exercise, 1:1 with PT x 34 minutes to improve his strength, ROM, and flexibility. He declined a cold pack after the session.     Date  11/15/17 11/06/17 10/25/17 10/23/17 10/18/17 10/16/17 10/11/17 10/09/17   VISIT 18/23 17/23 16/23 15/23 14/23 13/23 12/23    G CODE VISIT 9/10 8/10 7/10 6/10 5/10 4/10 3/10 2/10   POC EXP. DATE 12/05/17 12/05/17 12/05/17 12/05/17 12/05/17 12/05/17 12/05/17 12/05/17   VISIT AMOUNT    MEDICARE TOTAL 63.96    1386.72 95.94    1322.82 63.96    1226.88 63.96    1162.92 63.96    1098.96 63.96    1035.00 63.96    971.04     907.08   FACE-TO-FACE 12/06/17 12/06/17 11/05/17 11/05/17 11/05/17 11/05/17 11/05/17 11/05/17   FOTO -- -- -- -- 5/5                 Pulleys - flex 6' 6' 6' 6' 6' 6' Flex & abd 3' ea 3'   TABLE:           Wand   - flex  - abd  -ER   1 x 20  1 x 20  1 x 20   1 x 20  1 x 20  1 x 20   1 x 20  1 x 20  1 x 20   1 x 20  1 x 20  1 x 20   1 x 20  1 x 20  1 x 20   1 x 20  1 x 20  1 x 20   1 x 10  1 x 10  1 x 10   1 x 10  --   Scapula protraction 2 x 10 x  "1# 1 x 30 1 x 30 1 x 30 1 x 20 1 x 15 1 x 10    Prone  Flex  Abd  Scap  Rows   2 x 10 x 1#  2 x 10 x 1#  2 x 10 x 1#  2 x 10 x 1#   2 x 10  2 x 10  2 x 10  2 x 10   2 x 10  2 x 10  1 x 10  2 x 10    2 x 10  2 x 10  1 x 10  2 x 10   1 x 15  1 x 15  --  1 x 10   1 x 10  1 x 10  --  --     Side lying ER 2 x 10 x 1# 2 x 10 1 x 15 1 x 15 1 x 10      Side lying abd 2 x 10 x 1# 2 x 10 1 x 15 1 x 10       STANDING:           Walls walks - flex 1 x 10 1 x 10 1 x 10 1 x 10 1 x 10 1 x 10 1 x 10 1 x 10   Scapula squeeze -- 2 x 10 2 x 10 2 x 10 2 x 10 2 x 10 1 x 10    Rows  T's  W's  ER  I's 3 x 10 GTB  2 x 10 GTB  2 x 10 GTB  2 x 10 GTB  2 x 10 GTB 3 x 10 GTB  3 x 10 RTB  3 x 10 RTB  3 x 10 RTB  3 x 10 RTB 2 x 10 GTB  2 x 10 RTB  2 x 10 RTB  2 x 10 RTB  2 x 10 RTB   3 x 10 RTB  2 x 10 RTB  2 x 10 RTB  1 x 10 RTB  2 x 10 RTB 3 x 10 RTB  1 x 15 RTB  1 x 10 RTB  --  -- 2 x 10 RTB  1 x 10 RTB  --  --  -- 1 x 10 RTB    scaption 1 x 10 1 x 10 1 x 10 1 x 10 1 x 10 1 x 10     Wall push ups 2 x 10 2 x 10 1 x 15 1 x 15 1 x 10      Wall dribble 2 x 30"          CP declined declined declined declined declined declined declined declined              Initials DG DG GWA 1/6 DG DG DG DG GWA 1/6       Assessment:     Despite reporting that he performs his HEP on a regular basis he continues to require frequent cues to perform his usual exercises correctly. He was able to perform all of today's progressions and new exercises with no increase in symptoms prior to leaving the clinic.     Patient Education/Response:     Patient was instructed to continue with HEP daily.  Patient verbalized understanding instructions.    Plans and Goals:     Continue with plan of care and progress toward PT goals as patient tolerates.    Short Term Goals (4 Weeks):   1. This patient will be independent with a basic HEP. MET  2. This patient will increase L shoulder AROM by 25* in order to dress with less difficulty.  3. This patient will increase L UE strength by 1 " grade in order to reach a shelf at shoulder height.   4. This patient will have a pain rating of 5/10 at worst with ADLs. PARTIALLY MET  5. Patient will be able to achieve greater than or equal to 60 on the FOTO Shoulder Survey placing patient in 40%<60% impaired, limited, or restricted category demonstrating overall improved functional ability with upper extremity.     Long Term Goals (8 Weeks):   1. This patient will be independent with an updated HEP.  2. This patient will have L shoulder AROM equal to R shoulder AROM in order to perform overhead activities.  3. This patient will increase L UE strength to 4/5 in order to perform his usual ADLs with no difficulty.   4. This patient will have a pain rating of 3/10 at worst with ADLs.  5. Patient will be able to achieve greater than or equal to 75 on the FOTO Shoulder Survey placing patient in 20%<40% impaired, limited, or restricted category demonstrating overall improved functional ability with upper extremity.

## 2017-11-20 ENCOUNTER — CLINICAL SUPPORT (OUTPATIENT)
Dept: REHABILITATION | Facility: HOSPITAL | Age: 52
End: 2017-11-20
Attending: INTERNAL MEDICINE
Payer: MEDICARE

## 2017-11-20 DIAGNOSIS — R52 PAIN AGGRAVATED BY ACTIVITIES OF DAILY LIVING: ICD-10-CM

## 2017-11-20 DIAGNOSIS — M25.612 DECREASED RANGE OF MOTION OF LEFT SHOULDER: ICD-10-CM

## 2017-11-20 DIAGNOSIS — R29.898 WEAKNESS OF LEFT UPPER EXTREMITY: ICD-10-CM

## 2017-11-20 PROCEDURE — 97110 THERAPEUTIC EXERCISES: CPT

## 2017-11-20 PROCEDURE — G8984 CARRY CURRENT STATUS: HCPCS | Mod: CL

## 2017-11-20 PROCEDURE — G8985 CARRY GOAL STATUS: HCPCS | Mod: CJ

## 2017-11-20 NOTE — PROGRESS NOTES
TIME RECORD    Date:  11/20/2017    Start Time:  8:14  Stop Time:  8:50    PROCEDURES:    TIMED  Procedure Min.   Pulleys sup 6 NC   TE 30                 UNTIMED  Procedure Min.             Total Timed Minutes:  30  Total Timed Units:  2  Total Untimed Units:  0  Charges Billed/# of units:  2 (TE-2)      Progress/Current Status    Subjective:     Patient ID: Juan Carlos Hammer is a 52 y.o. male.  Diagnosis:   1. Decreased range of motion of left shoulder     2. Weakness of left upper extremity     3. Pain aggravated by activities of daily living       Pain: 6 /10  Patient reports his pain is about the same, no change.    Objective:     Patient initiated the session on pulleys x 6 minutes supervised by PTA.  Patient was educated and performed therapeutic exercises as per log below, along with today's progressions, 1:1 with PTA x 30 minutes to improve his strength, ROM, and flexibility.  He declined a cold pack after the session.  See today's note by PT Chuyita Ledezma for measurements and updated G code.    Date  11/20/17 11/15/17 11/06/17 10/25/17 10/23/17 10/18/17 10/16/17 10/11/17 10/09/17   VISIT 19/23 18/23 17/23 16/23 15/23 14/23 13/23 12/23    G CODE VISIT 1/10 9/10 8/10 7/10 6/10 5/10 4/10 3/10 2/10   POC EXP. DATE 12/05/17 12/05/17 12/05/17 12/05/17 12/05/17 12/05/17 12/05/17 12/05/17 12/05/17   VISIT AMOUNT    MEDICARE TOTAL 63.96    1450.68 63.96    1386.72 95.94    1322.82 63.96    1226.88 63.96    1162.92 63.96    1098.96 63.96    1035.00 63.96    971.04     907.08   FACE-TO-FACE 12/06/17 12/06/17 12/06/17 11/05/17 11/05/17 11/05/17 11/05/17 11/05/17 11/05/17   FOTO -- -- -- -- -- 5/5                  Pulleys - flex 6' 6' 6' 6' 6' 6' 6' Flex & abd 3' ea 3'   TABLE:            Bc   - flex  - abd  -ER   1 x 20  1 x 20  1 x 20    1 x 20  1 x 20  1 x 20   1 x 20  1 x 20  1 x 20   1 x 20  1 x 20  1 x 20   1 x 20  1 x 20  1 x 20   1 x 20  1 x 20  1 x 20   1 x 20  1 x 20  1 x 20   1 x 10  1 x 10  1 x 10   1 x 10  --  "  Scapula protraction 2 x 10 x 1# 2 x 10 x 1# 1 x 30 1 x 30 1 x 30 1 x 20 1 x 15 1 x 10    Prone  Flex  Abd  Scap  Rows   1 x 25 x 1#  1 x 25 x 1#  1 x 25 x 1#  1 x 25 x 1#   2 x 10 x 1#  2 x 10 x 1#  2 x 10 x 1#  2 x 10 x 1#   2 x 10  2 x 10  2 x 10  2 x 10   2 x 10  2 x 10  1 x 10  2 x 10    2 x 10  2 x 10  1 x 10  2 x 10   1 x 15  1 x 15  --  1 x 10   1 x 10  1 x 10  --  --     Side lying ER 2 x 10 x 1# 2 x 10 x 1# 2 x 10 1 x 15 1 x 15 1 x 10      Side lying abd 2 x 10 x 1# 2 x 10 x 1# 2 x 10 1 x 15 1 x 10       STANDING:            Walls walks - flex 1 x 10 1 x 10 1 x 10 1 x 10 1 x 10 1 x 10 1 x 10 1 x 10 1 x 10   Scapula squeeze -- -- 2 x 10 2 x 10 2 x 10 2 x 10 2 x 10 1 x 10    Rows  T's  W's  ER  I's 3 x 10 GTB  2 x 10 GTB  2 x 10 GTB  3 x 10 GTB  2 x 10 GTB 3 x 10 GTB  2 x 10 GTB  2 x 10 GTB  2 x 10 GTB  2 x 10 GTB 3 x 10 GTB  3 x 10 RTB  3 x 10 RTB  3 x 10 RTB  3 x 10 RTB 2 x 10 GTB  2 x 10 RTB  2 x 10 RTB  2 x 10 RTB  2 x 10 RTB   3 x 10 RTB  2 x 10 RTB  2 x 10 RTB  1 x 10 RTB  2 x 10 RTB 3 x 10 RTB  1 x 15 RTB  1 x 10 RTB  --  -- 2 x 10 RTB  1 x 10 RTB  --  --  -- 1 x 10 RTB    scaption 1 x 10 1 x 10 1 x 10 1 x 10 1 x 10 1 x 10 1 x 10     Wall push ups 2 x 10 2 x 10 2 x 10 1 x 15 1 x 15 1 x 10      Wall dribble 2 x 30" 2 x 30"          CP declined declined declined declined declined declined declined declined declined               Initials GWA 1/ 6 DG DG GWA 1/6 DG DG DG DG GWA 1/6       Assessment:     Patient continues to report that he performs his HEP on a regular basis, he continues to require frequent cues to perform his usual exercises correctly.  Patient had to ask which shoulder he performing his side lying exercises on, stating "I don't remember which shoulder I do the exercises on."  Patient completed his therapy along with today's progressions with no increase in symptoms prior to leaving the clinic.     Patient Education/Response:     Patient was instructed to continue with HEP daily.  " Patient verbalized understanding instructions.    Plans and Goals:     Continue with plan of care and progress toward PT goals as patient tolerates.    Short Term Goals (4 Weeks):   1. This patient will be independent with a basic HEP. MET  2. This patient will increase L shoulder AROM by 25* in order to dress with less difficulty.  3. This patient will increase L UE strength by 1 grade in order to reach a shelf at shoulder height.   4. This patient will have a pain rating of 5/10 at worst with ADLs. PARTIALLY MET  5. Patient will be able to achieve greater than or equal to 60 on the FOTO Shoulder Survey placing patient in 40%<60% impaired, limited, or restricted category demonstrating overall improved functional ability with upper extremity.     Long Term Goals (8 Weeks):   1. This patient will be independent with an updated HEP.  2. This patient will have L shoulder AROM equal to R shoulder AROM in order to perform overhead activities.  3. This patient will increase L UE strength to 4/5 in order to perform his usual ADLs with no difficulty.   4. This patient will have a pain rating of 3/10 at worst with ADLs.  5. Patient will be able to achieve greater than or equal to 75 on the FOTO Shoulder Survey placing patient in 20%<40% impaired, limited, or restricted category demonstrating overall improved functional ability with upper extremity.

## 2017-11-20 NOTE — PROGRESS NOTES
Progress/Current Status    Subjective:     Patient ID: Juan Carlos Hammer is a 52 y.o. male.  Diagnosis:   1. Decreased range of motion of left shoulder     2. Weakness of left upper extremity     3. Pain aggravated by activities of daily living       Pain: 6 /10  See daily progress note by Marcelo Herr PTA    Objective:     Shoulder   Right     Left   Pain/Dysfunction with Movement     AROM PROM MMT AROM PROM MMT     Flexion 175 WNL 3/5 165 WNL 3/5     Extension NT NT 5/5 NT NT 5/5     Abduction 178 WNL 3/5 178 WNL 3/5     IR@90*abd 90 WNL NT 90 WNL NT     ER@90*abd 90 WNL NT 65 90 NT     IR@0*abd NT NT 5/5 NT NT 5/5     ER@0*abd NT NT 5/5 NT NT 3/5       FOTO Shoulder 30, G code CL, 60%<80%  Assessment:     Patient is able to demonstrate AROM of his L shoulder WFL but his strength continues to be grossly 3/5. He reports feeling his shoulder is stronger and he is able to perform all of his usual exercises in the clinic with no difficulty. His current score on FOTO Shoulder places him in the 60%<80% impaired, limited, or restricted category, an increase from his initial evaluation score that placed him in the 40%<60% impaired, limited, or restricted category.     See daily progress note by Marcelo Herr PTA    Patient Education/Response:     See daily progress note by Marcelo Herr PTA    Plans and Goals:     See daily progress note by Marcelo Herr PTA

## 2017-11-27 ENCOUNTER — CLINICAL SUPPORT (OUTPATIENT)
Dept: REHABILITATION | Facility: HOSPITAL | Age: 52
End: 2017-11-27
Attending: INTERNAL MEDICINE
Payer: MEDICARE

## 2017-11-27 DIAGNOSIS — R52 PAIN AGGRAVATED BY ACTIVITIES OF DAILY LIVING: ICD-10-CM

## 2017-11-27 DIAGNOSIS — M25.612 DECREASED RANGE OF MOTION OF LEFT SHOULDER: ICD-10-CM

## 2017-11-27 DIAGNOSIS — R29.898 WEAKNESS OF LEFT UPPER EXTREMITY: ICD-10-CM

## 2017-11-27 PROCEDURE — 97110 THERAPEUTIC EXERCISES: CPT | Mod: PO

## 2017-11-27 NOTE — PROGRESS NOTES
"TIME RECORD    Date:  11/27/2017    Start Time:  8:00  Stop Time:  8:40    PROCEDURES:    TIMED  Procedure Min.   Pulleys sup 6 NC   TE 24   TE sup 10 NC             UNTIMED  Procedure Min.             Total Timed Minutes:  24  Total Timed Units:  2  Total Untimed Units:  0  Charges Billed/# of units:  2 (TE-2)      Progress/Current Status    Subjective:     Patient ID: Juan Carlos Hammer is a 52 y.o. male.  Diagnosis:   1. Decreased range of motion of left shoulder     2. Weakness of left upper extremity     3. Pain aggravated by activities of daily living       Pain: 6 /10  Patient reports his pain is always the same, "but my shoulder is getting better".  Patient reports his legs are bothering him again and that he may have to come back for more therapy on his legs again but when asked if he is still doing his exercises he was given for his legs he stated he doesn't have time to do them.    Objective:     Patient initiated the session on pulleys x 6 minutes supervised by PTA.  Patient was educated and performed therapeutic exercises as per log below, along with today's progressions, 1:1 with PTA x 24 minutes and supervised exercises by PTA x 10 minutes to improve his strength, ROM, and flexibility.  He declined a cold pack after the session.  Patient declined a cold pack at he end of today's session.    Date  11/27/17 11/20/17 11/15/17 11/06/17 10/25/17 10/23/17 10/18/17 10/16/17 10/11/17 10/09/17   VISIT 20/23 19/23 18/23 17/23 16/23 15/23 14/23 13/23 12/23    G CODE VISIT 2/10 1/10 9/10 8/10 7/10 6/10 5/10 4/10 3/10 2/10   POC EXP. DATE 12/05/17 12/05/17 12/05/17 12/05/17 12/05/17 12/05/17 12/05/17 12/05/17 12/05/17 12/05/17   VISIT AMOUNT    MEDICARE TOTAL 63.96    1514.64 63.96    1450.68 63.96    1386.72 95.94    1322.82 63.96    1226.88 63.96    1162.92 63.96    1098.96 63.96    1035.00 63.96    971.04     907.08   FACE-TO-FACE 12/06/17 12/06/17 12/06/17 12/06/17 11/05/17 11/05/17 11/05/17 11/05/17 11/05/17 11/05/17 " "  FOTO -- -- -- -- -- -- 5/5                   Pulleys - flex 6' 6' 6' 6' 6' 6' 6' 6' Flex & abd 3' ea 3'   TABLE:             Wand   - flex  - abd  -ER   1 x 20  1 x 20  1 x 20   1 x 20  1 x 20  1 x 20    1 x 20  1 x 20  1 x 20   1 x 20  1 x 20  1 x 20   1 x 20  1 x 20  1 x 20   1 x 20  1 x 20  1 x 20   1 x 20  1 x 20  1 x 20   1 x 20  1 x 20  1 x 20   1 x 10  1 x 10  1 x 10   1 x 10  --   Scapula protraction 1 x 25 x 1# 2 x 10 x 1# 2 x 10 x 1# 1 x 30 1 x 30 1 x 30 1 x 20 1 x 15 1 x 10    Prone  Flex  Abd  Scap  Rows   1 x 25 x 1#  1 x 25 x 1#  1 x 25 x 1#  1 x 25 x 1#    1 x 25 x 1#  1 x 25 x 1#  1 x 25 x 1#  1 x 25 x 1#   2 x 10 x 1#  2 x 10 x 1#  2 x 10 x 1#  2 x 10 x 1#   2 x 10  2 x 10  2 x 10  2 x 10   2 x 10  2 x 10  1 x 10  2 x 10    2 x 10  2 x 10  1 x 10  2 x 10   1 x 15  1 x 15  --  1 x 10   1 x 10  1 x 10  --  --     Side lying ER 2 x 10 x 1# 2 x 10 x 1# 2 x 10 x 1# 2 x 10 1 x 15 1 x 15 1 x 10      Side lying abd 2 x 10 x 1# 2 x 10 x 1# 2 x 10 x 1# 2 x 10 1 x 15 1 x 10       STANDING:             Walls walks - flex 1 x 10 1 x 10 1 x 10 1 x 10 1 x 10 1 x 10 1 x 10 1 x 10 1 x 10 1 x 10   Scapula squeeze -- -- -- 2 x 10 2 x 10 2 x 10 2 x 10 2 x 10 1 x 10    Rows  T's  W's  ER  I's 3 x 10 GTB  2 x 10 GTB  2 x 10 GTB  3 x 10 GTB  3 x 10 GTB  3 x 10 GTB  2 x 10 GTB  2 x 10 GTB  3 x 10 GTB  2 x 10 GTB 3 x 10 GTB  2 x 10 GTB  2 x 10 GTB  2 x 10 GTB  2 x 10 GTB 3 x 10 GTB  3 x 10 RTB  3 x 10 RTB  3 x 10 RTB  3 x 10 RTB 2 x 10 GTB  2 x 10 RTB  2 x 10 RTB  2 x 10 RTB  2 x 10 RTB   3 x 10 RTB  2 x 10 RTB  2 x 10 RTB  1 x 10 RTB  2 x 10 RTB 3 x 10 RTB  1 x 15 RTB  1 x 10 RTB  --  -- 2 x 10 RTB  1 x 10 RTB  --  --  -- 1 x 10 RTB    scaption 1 x 10 1 x 10 1 x 10 1 x 10 1 x 10 1 x 10 1 x 10 1 x 10     Wall push ups 2 x 10 2 x 10 2 x 10 2 x 10 1 x 15 1 x 15 1 x 10      Wall dribble  2 x 30" 2 x 30"          CP declined declined declined declined declined declined declined declined declined declined              "   Initials GWA 2/6 GWA 1/ 6 DG DG GWA 1/6 DG DG DG DG GWA 1/6       Assessment:     Patient continues to report that he performs his HEP on a regular basis, he continues to require frequent cues to perform his usual exercises correctly.  Patient completed his therapy along with today's progressions with no increase in symptoms prior to leaving the clinic.     Patient Education/Response:     Patient was instructed to continue with HEP daily.  Patient verbalized understanding instructions.    Plans and Goals:     Continue with plan of care and progress toward PT goals as patient tolerates.    Short Term Goals (4 Weeks):   1. This patient will be independent with a basic HEP. MET  2. This patient will increase L shoulder AROM by 25* in order to dress with less difficulty.  3. This patient will increase L UE strength by 1 grade in order to reach a shelf at shoulder height.   4. This patient will have a pain rating of 5/10 at worst with ADLs. PARTIALLY MET  5. Patient will be able to achieve greater than or equal to 60 on the FOTO Shoulder Survey placing patient in 40%<60% impaired, limited, or restricted category demonstrating overall improved functional ability with upper extremity.     Long Term Goals (8 Weeks):   1. This patient will be independent with an updated HEP.  2. This patient will have L shoulder AROM equal to R shoulder AROM in order to perform overhead activities.  3. This patient will increase L UE strength to 4/5 in order to perform his usual ADLs with no difficulty.   4. This patient will have a pain rating of 3/10 at worst with ADLs.  5. Patient will be able to achieve greater than or equal to 75 on the FOTO Shoulder Survey placing patient in 20%<40% impaired, limited, or restricted category demonstrating overall improved functional ability with upper extremity.

## 2017-12-04 ENCOUNTER — CLINICAL SUPPORT (OUTPATIENT)
Dept: REHABILITATION | Facility: HOSPITAL | Age: 52
End: 2017-12-04
Attending: INTERNAL MEDICINE
Payer: MEDICARE

## 2017-12-04 DIAGNOSIS — M25.612 DECREASED RANGE OF MOTION OF LEFT SHOULDER: ICD-10-CM

## 2017-12-04 DIAGNOSIS — R52 PAIN AGGRAVATED BY ACTIVITIES OF DAILY LIVING: ICD-10-CM

## 2017-12-04 DIAGNOSIS — R29.898 WEAKNESS OF LEFT UPPER EXTREMITY: ICD-10-CM

## 2017-12-04 PROCEDURE — G8986 CARRY D/C STATUS: HCPCS | Mod: CM,PO

## 2017-12-04 PROCEDURE — G8985 CARRY GOAL STATUS: HCPCS | Mod: CJ,PO

## 2017-12-04 PROCEDURE — 97110 THERAPEUTIC EXERCISES: CPT | Mod: PO

## 2017-12-04 NOTE — PROGRESS NOTES
TIME RECORD    Date:  12/04/2017    Start Time:  8:00  Stop Time:  8:45    PROCEDURES:    TIMED  Procedure Min.   Pulleys sup 6 NC   TE 39                 UNTIMED  Procedure Min.             Total Timed Minutes:  39  Total Timed Units:  3  Total Untimed Units:  0  Charges Billed/# of units:  3 (TE-3)      Progress/Current Status    Subjective:     Patient ID: Juan Carlos Hammer is a 52 y.o. male.  Diagnosis:   1. Decreased range of motion of left shoulder     2. Weakness of left upper extremity     3. Pain aggravated by activities of daily living       Pain: 4 /10  He reports doing better and his feels he is ready for discharge. He has admits to doing his exercises in the bed all the time but has not been doing his theraband exercises as he has been too busy. He reports doing push ups on the floor at home with no difficulty.     Objective:     Patient initiated the session on pulleys x 6 minutes supervised by PT.  Patient was educated and performed therapeutic exercises as per log below, along with today's reassessment, 1:1 with PT x 39 minutes to improve his strength, ROM, and flexibility.  He declined a cold pack after the session.      Date  12/04/17 11/27/17 11/20/17 11/15/17 11/06/17 10/25/17 10/23/17 10/18/17 10/16/17 10/11/17 10/09/17   VISIT 21/23 20/23 19/23 18/23 17/23 16/23 15/23 14/23 13/23 12/23    G CODE VISIT 3/10 2/10 1/10 9/10 8/10 7/10 6/10 5/10 4/10 3/10 2/10   POC EXP. DATE 12/05/17 12/05/17 12/05/17 12/05/17 12/05/17 12/05/17 12/05/17 12/05/17 12/05/17 12/05/17 12/05/17   VISIT AMOUNT    MEDICARE TOTAL 95.94    1610.58 63.96    1514.64 63.96    1450.68 63.96    1386.72 95.94    1322.82 63.96    1226.88 63.96    1162.92 63.96    1098.96 63.96    1035.00 63.96    971.04     907.08   FACE-TO-FACE 12/06/17 12/06/17 12/06/17 12/06/17 12/06/17 11/05/17 11/05/17 11/05/17 11/05/17 11/05/17 11/05/17   FOTO -- -- -- -- -- -- -- 5/5                    Pulleys - flex 6' 6' 6' 6' 6' 6' 6' 6' 6' Flex & abd 3' ea 3'  "  TABLE:              Wand   - flex  - abd  -ER   1 x 20  1 x 20  1 x 20   1 x 20  1 x 20  1 x 20   1 x 20  1 x 20  1 x 20    1 x 20  1 x 20  1 x 20   1 x 20  1 x 20  1 x 20   1 x 20  1 x 20  1 x 20   1 x 20  1 x 20  1 x 20   1 x 20  1 x 20  1 x 20   1 x 20  1 x 20  1 x 20   1 x 10  1 x 10  1 x 10   1 x 10  --   Scapula protraction 1 x 25 x 1# 1 x 25 x 1# 2 x 10 x 1# 2 x 10 x 1# 1 x 30 1 x 30 1 x 30 1 x 20 1 x 15 1 x 10    Prone  Flex  Abd  Scap  Rows   1 x 25 x 1#  1 x 25 x 1#  1 x 25 x 1#  1 x 25 x 1#   1 x 25 x 1#  1 x 25 x 1#  1 x 25 x 1#  1 x 25 x 1#    1 x 25 x 1#  1 x 25 x 1#  1 x 25 x 1#  1 x 25 x 1#   2 x 10 x 1#  2 x 10 x 1#  2 x 10 x 1#  2 x 10 x 1#   2 x 10  2 x 10  2 x 10  2 x 10   2 x 10  2 x 10  1 x 10  2 x 10    2 x 10  2 x 10  1 x 10  2 x 10   1 x 15  1 x 15  --  1 x 10   1 x 10  1 x 10  --  --     Side lying ER 2 x 10 x 1# 2 x 10 x 1# 2 x 10 x 1# 2 x 10 x 1# 2 x 10 1 x 15 1 x 15 1 x 10      Side lying abd 2 x 10 x 1# 2 x 10 x 1# 2 x 10 x 1# 2 x 10 x 1# 2 x 10 1 x 15 1 x 10       STANDING:              Walls walks - flex 1 x 10 1 x 10 1 x 10 1 x 10 1 x 10 1 x 10 1 x 10 1 x 10 1 x 10 1 x 10 1 x 10   Scapula squeeze -- -- -- -- 2 x 10 2 x 10 2 x 10 2 x 10 2 x 10 1 x 10    Rows  T's  W's  ER  I's 3 x 10 GTB  2 x 10 GTB  2 x 10 GTB  3 x 10 GTB  3 x 10 GTB 3 x 10 GTB  2 x 10 GTB  2 x 10 GTB  3 x 10 GTB  3 x 10 GTB  3 x 10 GTB  2 x 10 GTB  2 x 10 GTB  3 x 10 GTB  2 x 10 GTB 3 x 10 GTB  2 x 10 GTB  2 x 10 GTB  2 x 10 GTB  2 x 10 GTB 3 x 10 GTB  3 x 10 RTB  3 x 10 RTB  3 x 10 RTB  3 x 10 RTB 2 x 10 GTB  2 x 10 RTB  2 x 10 RTB  2 x 10 RTB  2 x 10 RTB   3 x 10 RTB  2 x 10 RTB  2 x 10 RTB  1 x 10 RTB  2 x 10 RTB 3 x 10 RTB  1 x 15 RTB  1 x 10 RTB  --  -- 2 x 10 RTB  1 x 10 RTB  --  --  -- 1 x 10 RTB    scaption 1 x 10 1 x 10 1 x 10 1 x 10 1 x 10 1 x 10 1 x 10 1 x 10 1 x 10     Wall push ups 2 x 10 2 x 10 2 x 10 2 x 10 2 x 10 1 x 15 1 x 15 1 x 10      Wall dribble 2 x 30"  2 x 30" 2 x 30"          CP declined " "declined declined declined declined declined declined declined declined declined declined                 Initials DG GWA 2/6 GWA 1/ 6 DG DG GWA 1/6 DG DG DG DG GWA 1/6     Shoulder   Right     Left   Pain/Dysfunction with Movement     AROM PROM MMT AROM PROM MMT     Flexion 175 WNL 3/5 170 WNL 3/5     Extension NT NT 5/5 NT NT 5/5     Abduction 178 WNL 3+/5 174 WNL 3/5     IR@90*abd 90 WNL NT 90 WNL NT     ER@90*abd 90 WNL NT 90 WNL NT     IR@0*abd NT NT 5/5 NT NT 5/5     ER@0*abd NT NT 4/5 NT NT 3/5       FOTO Shoulder  Assessment:     Patient demonstrates B shoulder AROM WNL with reported discomfort with ER AROM testing with shoulder at 90* abd, but was able to perform wand exercise in the same position for ER with no complaints. He continues to demonstrate B shoulder flexion of 3/5 and L shoulder abd of 3/5 despite performing all of his usual therapeutic exercises with no difficulty using green theraband and 1lb hand weight. He performed all bed mobility and transfers with no difficulty. His current score on FOTO shoulder 4 places him in the 80%<100% impaired, limited, or restricted category, despite performing all of his usual exercises with no difficulty or complaints of pain today. Patient answered all questions "I can't do this". He continues to require cues to perform his HEP correctly and having been given handouts. This patient has met all goals set for him to his max rehab potential and is ready for discharge to [facility].    Patient Education/Response:     Patient was instructed to continue with HEP daily.  Patient verbalized understanding instructions.    Plans and Goals:     Discharge to [facility].    Short Term Goals (4 Weeks):   1. This patient will be independent with a basic HEP. MET  2. This patient will increase L shoulder AROM by 25* in order to dress with less difficulty. MET  3. This patient will increase L UE strength by 1 grade in order to reach a shelf at shoulder height. PARTIALLY MET  4. This " patient will have a pain rating of 5/10 at worst with ADLs. MET  5. Patient will be able to achieve greater than or equal to 60 on the FOTO Shoulder Survey placing patient in 40%<60% impaired, limited, or restricted category demonstrating overall improved functional ability with upper extremity. NOT MET    Long Term Goals (8 Weeks):   1. This patient will be independent with an updated HEP.MET  2. This patient will have L shoulder AROM equal to R shoulder AROM in order to perform overhead activities. MET  3. This patient will increase L UE strength to 4/5 in order to perform his usual ADLs with no difficulty.  PARTIALLY MET  4. This patient will have a pain rating of 3/10 at worst with ADLs.PARTIALLY MET  5. Patient will be able to achieve greater than or equal to 75 on the FOTO Shoulder Survey placing patient in 20%<40% impaired, limited, or restricted category demonstrating overall improved functional ability with upper extremity. NOT MET

## 2017-12-04 NOTE — PLAN OF CARE
"REHAB SERVICES OUTPATIENT DISCHARGE SUMMARY  Physical Therapy      Name:  Juan Carlos Hammer  Date:  12/04/2017  Date of Evaluation:  10/09/2017  Physician:  Araceli Mcgregor MD  Total # Of Visits:  23  Cancelled:  3  No Shows:  2  Diagnosis:    1. Decreased range of motion of left shoulder     2. Weakness of left upper extremity     3. Pain aggravated by activities of daily living         Physical/Functional Status:  At time of discharge, patient was able to demonstrates B shoulder AROM WNL with reported discomfort with ER AROM testing with shoulder at 90* abd, but was able to perform wand exercise in the same position for ER with no complaints. He continues to demonstrate B shoulder flexion of 3/5 and L shoulder abd of 3/5 despite performing all of his usual therapeutic exercises with no difficulty using green theraband and 1lb hand weight. He performed all bed mobility and transfers with no difficulty. His current score on FOTO shoulder 4 places him in the 80%<100% impaired, limited, or restricted category, despite performing all of his usual exercises with no difficulty or complaints of pain today. Patient answered all questions "I can't do this". He continues to require cues to perform his HEP correctly and having been given handouts. This patient has met all goals set for him to his max rehab potential and is ready for discharge to [facility].    The patient is to be discharged from our Therapy service for the following reason(s):  Patient is now asymptomatic and Patient has reached the maximum rehab potential for the present time    Degree of Goal Achievement:  Patient has partially met goals    Patient Education:  Patient was educated to continue with his HEP on a daily basis.     Discharge Plan:  Home Program:  For UE strength, ROM, and flexibility.     Chuyita Ledezma, PT      "

## 2018-01-12 DIAGNOSIS — M25.512 LEFT SHOULDER PAIN: Primary | ICD-10-CM

## 2018-01-15 ENCOUNTER — HOSPITAL ENCOUNTER (OUTPATIENT)
Dept: RADIOLOGY | Facility: HOSPITAL | Age: 53
Discharge: HOME | End: 2018-01-15
Attending: INTERNAL MEDICINE
Payer: MEDICARE

## 2018-01-15 DIAGNOSIS — M25.512 LEFT SHOULDER PAIN: ICD-10-CM

## 2018-01-15 PROCEDURE — 73221 MRI JOINT UPR EXTREM W/O DYE: CPT | Mod: TC,PO,LT

## 2019-07-14 ENCOUNTER — HOSPITAL ENCOUNTER (EMERGENCY)
Facility: HOSPITAL | Age: 54
Discharge: HOME | End: 2019-07-14
Attending: SURGERY
Payer: MEDICARE

## 2019-07-14 VITALS
TEMPERATURE: 99 F | DIASTOLIC BLOOD PRESSURE: 92 MMHG | SYSTOLIC BLOOD PRESSURE: 145 MMHG | WEIGHT: 190 LBS | HEIGHT: 66 IN | HEART RATE: 97 BPM | BODY MASS INDEX: 30.53 KG/M2 | OXYGEN SATURATION: 98 % | RESPIRATION RATE: 18 BRPM

## 2019-07-14 DIAGNOSIS — S37.30XA: Primary | ICD-10-CM

## 2019-07-14 PROCEDURE — 99284 EMERGENCY DEPT VISIT MOD MDM: CPT | Mod: ER

## 2019-07-14 RX ORDER — HYDROCODONE BITARTRATE AND ACETAMINOPHEN 5; 325 MG/1; MG/1
1 TABLET ORAL EVERY 8 HOURS PRN
Qty: 10 TABLET | Refills: 0 | Status: SHIPPED | OUTPATIENT
Start: 2019-07-14

## 2019-07-14 RX ORDER — MUPIROCIN 20 MG/G
OINTMENT TOPICAL 3 TIMES DAILY
Qty: 22 G | Refills: 1 | Status: SHIPPED | OUTPATIENT
Start: 2019-07-14

## 2019-07-14 NOTE — ED PROVIDER NOTES
"Encounter Date: 7/14/2019       History     Chief Complaint   Patient presents with    Body Laceration     Pt states was using new sex toy and now has sore on "the under part of my manhood".  Pt states occurred 3 days ago. Pt states has been applying hydrogen peroxide.      Patient states the hurt the tip of his glans secondary to friction injury with inanimate object 3 days ago the exact nature of the injuries was difficult to understand but he states that it hurts when he pees and the urethra ending is painful.  He is able to urinate     The history is provided by the patient.   Male  Problem   Primary symptoms include dysuria, penile pain. This is a new problem. The current episode started several days ago. The problem occurs intermittently. The problem has been unchanged. The symptoms occur during urination. The discharge is bloody. Pertinent negatives include no anorexia and no diaphoresis.     Review of patient's allergies indicates:   Allergen Reactions    Haldol [haloperidol lactate] Other (See Comments)     Past Medical History:   Diagnosis Date    Diabetes mellitus     Hypertension      History reviewed. No pertinent surgical history.  History reviewed. No pertinent family history.  Social History     Tobacco Use    Smoking status: Never Smoker    Smokeless tobacco: Never Used   Substance Use Topics    Alcohol use: Not on file    Drug use: Not on file     Review of Systems   Constitutional: Negative.  Negative for diaphoresis.   HENT: Negative.    Eyes: Negative.    Respiratory: Negative.    Cardiovascular: Negative for chest pain.   Gastrointestinal: Negative.  Negative for anorexia.   Genitourinary: Positive for dysuria and penile pain.   Musculoskeletal: Negative.    Allergic/Immunologic: Negative.    Hematological: Negative.        Physical Exam     Initial Vitals [07/14/19 1224]   BP Pulse Resp Temp SpO2   (!) 145/92 97 18 98.5 °F (36.9 °C) 98 %      MAP       --         Physical " Exam    Nursing note and vitals reviewed.  Genitourinary:               ED Course   Procedures  Labs Reviewed - No data to display       Imaging Results    None          Medical Decision Making:   Initial Assessment:   Presumed intraurethra injury from trauma  ED Management:  Patient able to urinate there is no deformity of used urethra or glands recommend symptomatic treatment follow-up with urologist if he can't urinate                      Clinical Impression:       ICD-10-CM ICD-9-CM   1. Closed injury of urethra, initial encounter S37.30XA 867.0         Disposition:   Disposition: Discharged  Condition: Stable                        VIRA Arcos III, MD  07/14/19 [id number]

## 2020-03-30 ENCOUNTER — HOSPITAL ENCOUNTER (EMERGENCY)
Facility: HOSPITAL | Age: 55
Discharge: HOME | End: 2020-03-30
Attending: EMERGENCY MEDICINE
Payer: MEDICARE

## 2020-03-30 VITALS
OXYGEN SATURATION: 100 % | TEMPERATURE: 98 F | HEART RATE: 85 BPM | RESPIRATION RATE: 18 BRPM | DIASTOLIC BLOOD PRESSURE: 82 MMHG | WEIGHT: 186 LBS | HEIGHT: 66 IN | BODY MASS INDEX: 29.89 KG/M2 | SYSTOLIC BLOOD PRESSURE: 159 MMHG

## 2020-03-30 DIAGNOSIS — R20.2 PARESTHESIA: Primary | ICD-10-CM

## 2020-03-30 LAB — POCT GLUCOSE: 146 MG/DL (ref 70–110)

## 2020-03-30 PROCEDURE — 99282 EMERGENCY DEPT VISIT SF MDM: CPT | Mod: ER

## 2020-03-30 PROCEDURE — 82962 GLUCOSE BLOOD TEST: CPT | Mod: ER

## 2020-03-30 NOTE — ED PROVIDER NOTES
"Encounter Date: 3/30/2020       History     Chief Complaint   Patient presents with    Numbness     Pt states has had intermittent "numbness" to right arm and right leg x 1 month.  States worse last night.  Pt states is able to use right hand to lift things and hold things, pt states has not tripped or fallen during these episodes. Pt describes sensation as "falling asleep".      HPI     Pt is a 54 y.o. male w/h/o DM-II, HTN, who presents for "numbness". Pt reports tingling rather than numbness mostly in his RUE and a little in his right toes (not his entire leg as triage note suggests). Toes and RUE not always synchronous. No facial numbness or weakness. Reports that it is intermittent for last few weeks, current episode since 8 pm last night, is worse with rest, better with gentle movement "when [he] gets the blood flowing". Denies weakness, HA, LOC, change in vision, nausea, emesis, change in hearing / speech / cognition, change in balance. No fever, neck pain / stiffness, rash. No trauma. No urinary retention/incontinence. No CP, SOB, cough, abdominal pain, leg or arm swelling. Drinks maybe 1/2 beer a day, if that. Denies recreational drug use. Endorses some anxiety with COVID pandemic.          Review of patient's allergies indicates:   Allergen Reactions    Haldol [haloperidol lactate] Other (See Comments)     Past Medical History:   Diagnosis Date    Diabetes mellitus     Hypertension      History reviewed. No pertinent surgical history.  History reviewed. No pertinent family history.  Social History     Tobacco Use    Smoking status: Never Smoker    Smokeless tobacco: Never Used   Substance Use Topics    Alcohol use: Not on file    Drug use: Not on file     Review of Systems     General: No fever.  No chills.  Eyes: No visual changes.  Head: No headache.    Integument: No rashes or lesions.  Chest: No shortness of breath.  Cardiovascular: No chest pain.  Abdomen: No abdominal pain.  No nausea or " vomiting.  Urinary: No abnormal urination.  Neurologic: No focal weakness. +numbness.  Hematologic: No easy bruising.  Endocrine: No excessive thirst or urination.      Physical Exam     Initial Vitals [03/30/20 0811]   BP Pulse Resp Temp SpO2   (!) 159/82 85 18 97.5 °F (36.4 °C) 100 %      MAP       --         Physical Exam     Appearance: No acute distress.  HEENT: Normocephalic. Atraumatic. No conjunctival injection. EOMI. PERRL. Oropharynx clear.    Neck: No JVD. No thyromegaly or nodules. No LN. Neck supple.    Chest: Non-tender. Clear to auscultation bilaterally.  Good air movement.  No wheezes.  No rhonchi.  Cardiovascular: Regular rate and rhythm. No murmurs. No gallops. No rubs. +2 radial/DP/DT pulses bilaterally.  Abdomen: Soft. Not distended. Nontender. No guarding. No rebound. No Masses  Musculoskeletal: Good range of motion all joints. No deformities.    Neurologic: Alert and oriented x 3. Equal strength in all four extremities.  Normal sensation in all four extremities; except pt reports tingling in RUE.  V1-V3 sensation intact bilaterally.  Able to raise both eyebrows equally, close both eyes equally tight, and smile symmetrically.  Finger to nose intact with both arms. Normal ambulation, neg romberg. Neg Kernigs and Brudzinski.    Psych:  Appropriate, conversant.    Integumentary: No rashes seen.  Good turgor.  No abrasions.  No ecchymoses.      ED Course   Procedures  Labs Reviewed   POCT GLUCOSE - Abnormal; Notable for the following components:       Result Value    POCT Glucose 146 (*)     All other components within normal limits   POCT GLUCOSE MONITORING CONTINUOUS          Imaging Results    None                                          Clinical Impression:       ICD-10-CM ICD-9-CM   1. Paresthesia R20.2 782.0       53 yo male w/h/o DM and HTN reports intermittent tingling in RUE and right toes over last few weeks. Exam shows VSS, afeb. Neuro exam reassuring. Tingling changes with ranging his  right shoulder. Otherwise neuro exam non-focal. History and exam do not indicate CVA at this time, rather a peripheral nerve with tingling (not numbness), and change with movement of shoulder. Toes may be related to his DM that he reports he is not checking home BG -- checked here 146. Recommended daily baby ASA (if no contraindications per his PCP),  mg q6h (if no renal disease or h/o GIB). Many of his records are outside of our EMR, pt endorses good f/u with PCP.     Discussed results, diagnosis, and treatment plan with pt; advised close follow-up with PCP. Reviewed strict return precautions. Pt confirms understanding and ability to comply.                           Sujey Rodriguez MD  03/30/20 [id number]

## 2020-03-30 NOTE — DISCHARGE INSTRUCTIONS
If you do not have any kidney problems, please take ibuprofen 600 mg every six hours with food.    If you experience any new or worsening symptoms, please seek additional medical attention.     Never smoker